# Patient Record
Sex: MALE | Race: WHITE | NOT HISPANIC OR LATINO | Employment: OTHER | URBAN - METROPOLITAN AREA
[De-identification: names, ages, dates, MRNs, and addresses within clinical notes are randomized per-mention and may not be internally consistent; named-entity substitution may affect disease eponyms.]

---

## 2020-01-30 ENCOUNTER — TELEPHONE (OUTPATIENT)
Dept: PEDIATRIC GASTROENTEROLOGY | Facility: CLINIC | Age: 30
End: 2020-01-30

## 2020-01-31 NOTE — TELEPHONE ENCOUNTER
----- Message from Luz Osborn sent at 1/30/2020  4:01 PM CST -----  Contact: Maria Ines Nelson  States he was a previous patient of Dr Fatima's. She has some question regarding a procedure he had done with Dr Fatima. There are some concerns she has, because of some of the things he has going on with his esophagus. She would like to speak to Pam. The patient is 29 and she would like to know if Dr Fatima takes Medicaid. States he has down syndrome.Please call Maria Ines Nelson [phone]. Thank you

## 2020-02-05 ENCOUNTER — TELEPHONE (OUTPATIENT)
Dept: PEDIATRIC GASTROENTEROLOGY | Facility: CLINIC | Age: 30
End: 2020-02-05

## 2020-02-05 NOTE — TELEPHONE ENCOUNTER
----- Message from Bill Shaffer sent at 2/5/2020  3:42 PM CST -----  Contact: pt mother - dionisio   Type:  Patient Returning Call    Who Called:pt mother   Who Left Message for Patient:nurse for Dr  Does the patient know what this is regarding?:has a question about when the Dr did the scope on pt   Would the patient rather a call back or a response via [facility]? Phone   Best Call Back Number: [phone]  Additional Information: nurse called on 01/30/20

## 2020-02-05 NOTE — TELEPHONE ENCOUNTER
Mom is calling, son has Down Syndrome and had EGD done with Dr. Fatima previously. Mom is asking about how it looked.  RN informed mom she will have to contact [facility] to get the op report.  Mom states he will occasionally have food stuck in his throat. RN encouraged mom to contact PCP and get appt with adult GI doctor to be evaluated.

## 2022-12-30 DIAGNOSIS — R13.11 ORAL MOTOR DYSFUNCTION: ICD-10-CM

## 2022-12-30 DIAGNOSIS — R13.14 PHARYNGOESOPHAGEAL DYSPHAGIA: Primary | ICD-10-CM

## 2023-01-03 ENCOUNTER — CLINICAL SUPPORT (OUTPATIENT)
Dept: REHABILITATION | Facility: HOSPITAL | Age: 33
End: 2023-01-03
Payer: MEDICARE

## 2023-01-03 DIAGNOSIS — R47.1 DYSARTHRIA: ICD-10-CM

## 2023-01-03 DIAGNOSIS — R13.10 DYSPHAGIA, UNSPECIFIED TYPE: ICD-10-CM

## 2023-01-03 DIAGNOSIS — F80.0 SPEECH SOUND DISORDER: ICD-10-CM

## 2023-01-03 PROCEDURE — 92610 EVALUATE SWALLOWING FUNCTION: CPT | Performed by: SPEECH-LANGUAGE PATHOLOGIST

## 2023-01-03 PROCEDURE — 92522 EVALUATE SPEECH PRODUCTION: CPT | Performed by: SPEECH-LANGUAGE PATHOLOGIST

## 2023-01-03 NOTE — PROGRESS NOTES
See initial evaluation in plan of care.     Diana Burton MA, CCC-SLP  Speech Language Pathologist  1/4/2023

## 2023-01-04 PROBLEM — R47.1 DYSARTHRIA: Status: ACTIVE | Noted: 2023-01-04

## 2023-01-04 NOTE — PLAN OF CARE
"[facility]  Speech Therapy Evaluation -Dysphagia    Date: 1/3/2023     Name: Isaías Nelson   MRN: [id number]    Therapy Diagnosis:   Encounter Diagnoses   Name Primary?    Speech sound disorder     Dysphagia, unspecified type     Physician: Latrell Webb MD  Physician Orders: Ambulatory referral/consult to speech therapy   Medical Diagnosis: Pharyngoesophageal dysphagia [R13.14], Oral motor dysfunction [R13.11]    Visit # / Visits Authorized:  1 / 1   Date of Evaluation:  1/3/2023   Insurance Authorization Period: 12/30/2022 - 12/30/2023  Plan of Care Certification:    1/3/2023 to 3/14/23      Time In: 10:30 AM   Time Out: 11:18 AM   Procedure Min.   Swallow and Oral Function Evaluation   24   Evaluation of Speech Sound Production  24     Precautions: Standard, Fall, Cognition, Communication, Dysphagia, and Reduced health literacy   Subjective     History of Current Condition:   Patient presents on time today with his mother. Patient's mother provided history as well as was taken from chart review. Patient's medical history is significant for Down syndrome. Patient's mother reports years long history of food "getting stuck" during the swallow as well as gagging with eventual expectoration of foods. She also reports coughing with liquids with patient reporting "it went the wrong way". Chart review also significant for ~20 lb weight loss in last few years. Patient's mother reports most recent swallow study was completed ~3 years ago. Chart review significant for MBSS 8/10/20 with overall functional swallow and no recommendation for therapy. Patient's mother also reports overall significantly slow movements/actions such as feeding himself, walking, talking, closing doors, etc.      Patient's mother also reports very low volume and slow rate of speech both of which significantly impact intelligibility. Per mother, patient will add sounds, specifically, /t/, to the end of words also impacting " intelligibility. Patient completed PT, OT, and speech therapy as a child 25+ years ago.       Past Medical History: Isaías Nelson  has no past medical history on file.  Isaías Nelson  has no past surgical history on file.  Medical Hx and Allergies: Isaías currently has no medications in their medication list. Review of patient's allergies indicates:  Not on File    Prior Therapy:  yes-PT, OT, ST 25+ years ago  Social History:  Lives with parents   Prior Level of Function:  same as current  Current Level of Function: reported overt signs of dysphagia per parent and reduced intelligibility  Pain: no pain behaviors observed in today's session  Nutrition:  meeting all nutrition/hydration needs via PO intake  Patient's Therapy Goals:  evaluate swallow and speech functioning    Objective   Formal Assessment:    Cranial Nerve Examination  Cranial Nerve 5: Trigeminal Nerve  Motor Jaw Posture at rest: Closed  Mandible Elevation/Depression: WFL  Mandible lateralization: WFL  Abnormal movement: absent Interpretation: Intact bilaterally    Sensory Forehead: WFL  Cheek: WFL  Jaw: WFL  Facial Pain: None noted Interpretation: Intact bilaterally      Cranial Nerve 7: Facial Nerve  Motor Facial Symmetry: WNL  Wrinkle Forehead: WFL  Close eyes tightly: WFL  Labial Protrusion: WFL  Labial Retraction: WFL  Buccal Strength with Labial Seal: WFL  Abnormal movement: absent Interpretation: Intact bilaterally    Sensory Formal testing not completed.       Cranial Nerves IX and X: Glossopharyngeal and Vagus Nerves  Motor Palatal Symmetry (Rest): WNL  Palatal Symmetry (Movement): WNL  Cough: Perceptually strong  Voice Prior to PO intake: Clear  Resonance: Normal  Abnormal movement: absent Interpretation: Intact bilaterally      Cranial Nerve XII: Hypoglossal Nerve  Motor Tongue at rest: WNL  Lingual Protrusion: WNL  Lingual Protrusion against Resistance: reduced  Lingual Lateralization: WNL  Abnormal movement: absent Interpretation:  cannot rule out CN involvement     Other information:  Volitional Swallow: Able to palpate laryngeal rise  Mucosal Quality: No abnormal findings  Secretion Management: no overt deficits noted/observed  Dentition: Good condition for speech and mastication        Clinical Swallow Evaluation:   Predisposing dysphagia risk factors: Down Syndrome  Clinical signs of possible chronic dysphagia: parent reported gagging/coughing during meals, weight loss  Precipitating dysphagia risk factors: NA    EAT-10 Score:    Eating Assessment Tool (EAT-10) is a questionnaire given to the patient to  his swallowing function and quality of life as it relates to patient's perceived swallowing deficits. A score that is greater than 3 may be indicative of swallowing problems. (Fantasma et al., 2008); higher scores correlate with increased penetration-aspiration  scale scores, and scores greater than 15 results in the patient being 2.2 times more likely to aspirate (Prasanna et al., 2015)    Unable to complete secondary to patient's cognitive status.       [place] Swallow Protocol:  [place] Swallow Protocol dictates patient remain NPO if fail screener; (Pjr et al. 2014) however, as objective swallow assessment is not available for greater than a week, patient will remain on current diet until objective assessment is completed unless otherwise indicated.     The [place] Swallow Protocol was administered. The patient was alert and provided the instructions prior to the beginning of the protocol. The patient consumed 3 oz. Patient did not drink with consecutive swallows. Patient without overt signs or symptoms of penetration/aspiration.     Clinical Swallow Examination:   Of note, patient self-fed throughout evaluation. Patient presented with:     CONSISTENCY  NOTES   THIN (IDDSI 0) 3 oz water challenge    Patient without overt signs of aspiration. No change in vocal quality. Patient with significantly increased feeding time and delayed  initiation of oral swallow. 3-4 swallows required per bolus.    PUREE (IDDSI 4/Extremely Thick)   TSP bites of pudding x 5 Patient without overt signs of aspiration. No oral residue, or anterior loss of bolus. Patient with significantly increased feeding time and delayed initiation of oral swallow. Patient required 1-2 swallows per bolus.    SOLID (IDDSI 7/Regular) Bite of juan pablo cracker x 3   Patient without overt signs of aspiration. No oral residue, or anterior loss of bolus. Patient with significantly increased feeding time and delayed initiation of oral swallow. Patient required 1-2 swallows per bolus.      *Thickened liquids were not used in this assessment. Moraima (2018) reported that thickened liquids have no sound evidence as reducing risk of pneumonia in patients with dysphagia and can cause harm by increasing risk of dehydration. It also presents an increased risk of UTI, electrolyte imbalance, constipation, fecal impaction, cognitive impairment, functional decline, and even death (George, 2002; Freddy, 2016).  This supports the assertion that we should confirm a patient requires thickened liquids with an instrumental swallow study prior to recommending them.    INTERPRETATION:  Clinical swallow evaluation (CSE) revealed oral phase characterized by lingual, labial, and buccal strength and range of motion within functional limits for lip closure, bolus preparation and propulsion. The patient had no anterior loss of the bolus with complete closure of the lips around the cup rim/spoon. No residue remained in the oral cavity following the swallow. Patient without overt clinical signs/symptoms of aspiration on any PO trials given. Patient presents with a possibly inefficient swallow as indicated by multiple swallows per bolus and significantly increased feeding time. Contributing risk factors for dysphagia include cognitive deficits.     Clinical signs of oropharyngeal dysphagia, likely chronic related to  Down syndrome as well as cognitive impact on efficiency of swallow. Swallowing prognosis is Good secondary to family support. Instrumental swallow study is indicated to assess the swallowing physiology and rule out aspiration of various consistencies. Given prolonged wait time for outpatient instrumental studies, patient should continue current diet prior to instrumental study.      Articulation Assessment:  The Griffith Fristoe Test of Articulation - Third Edition (GFTA-3) was administered to assess Isaías's production of consonants at the individual word and sentence level. This assessment consisted of a series of color pictures, which Isaías was asked to label following specific instructions. Responses were recorded and analyzed to determine the presence/absence of an articulation delay/disorder.        Isaías scored a standard score of 43 on the Sounds-In-Words Subtest of the GFTA-3, which is below average for Isaías's chronological age level. This score indicates the presence of a speech sound disorder.      Sounds-in-Words Subtest  Raw Score Standard Score Percentile Rank   27 43 < 0.1     Sounds-in-words Phonetic Error Analysis  Sound Initial Medial Final   p   k  omit   b      t  k omit   d  omit    k   omit   kw      g b  k   m      n      ng      f   S/omit   v  b omit   ? n  omit   ð  omit    s      z s s    ?      t?      d?      r      l  y     ?      w      j      h      Blends Initial Medial Final   bl      br r     dr tr     fr      gl g     gr      kr      kw      nt      pl pr     pr      sl sw     st      sw      sp      tr          Ages at which 90% of the GFTA-3 Normative Sample Mastered Consonants and Consonant Clusters by Initial, Medial, and Final positions (Female):  (Note: Mastery = 85% or greater correct productions)  Age Initial Position Medial Position Final Position   2:0-2:5  /p/    2:6-2:11 /m/     3:0-3:5 /b/ /d/ /k/ /n/ /w/ /h/ /d/ /g/ /m/ /n/ /f/ /p/   3:6-3:11  /f/  /n/    4:0-4:5 /t/ /sp/ /st/ /b/ /k/ /ng/ /z/ /j/ /d/ /k/ /m/ /f/ /v/ /nt/   4:6-4:11  /ch/ /dg/ /l/ /j/ /fr/ /gl/ /pl/ /tr/ /ch/ /l/ /b/ /t/ /g/ /sh/ /ch/   5:0-5:11 /p/ /s/ /z/ /sh/ /bl/ /dr/ /kw/ /pr/ /sl/ /sw/ /sh/ /s/ /l/   6:0-6:11 /v/ /voiced th/ /r/ /br/ /gr/ /kr/ /v/ /s/ /dg/ /r/ /br/ /er/ /ng/ /z/ /r/   7:0-7:11  /r/ /br/ /fr/ /pr/ /sl/ /t/ /voiced th/ /voicless th/   8:0-8:11      >8:11        Ages at which 90% of the GF-3 Normative Sample Mastered Consonants and Consonant Clusters by Initial, Medial, and Final positions (Male):  (Note: Mastery = 85% or greater correct productions)  Age Initial Position Medial Position Final Position   2:0-2:5      2:6-2:11 /m/ /p/    3:0-3:5 /b/ /d/ /n/ /f/ /h/ /d/ /g/ /m/ /ng/ /f/ /p/ /n/ /f/    3:6-3:11  /k/ /w/ /n/ /z/ /j/  /b/ /d/ /k/ /m/ /nt/   4:0-4:5 /t/ /kw/ /b/ /k/ /g/ /v/   4:6-4:11  /s/ /sh/ /ch/ /dg/ /ch/ /sh/ /t/ /sh/ /ch/   5:0-5:11 /p/ /z/ /l/ /j/ /bl/ /pl/ /sp/ /st/ /sw/ /s/ /l/ /ng/ /z/    6:0-6:11 /g/ /v/ /dr/ /gl/ /gr/ /kr/ /tr/ /r/    7:0-7:11 /voiced th/ /r/ /br/ /fr/ /pr/ /sl/ /v/ /er/ /l/ /r/   8:0-8:11  /t/ /voiced th/ /dg/ /br/ /voiceless th/ /s/   >8:11 /voiceless th/       MOTOR SPEECH/DYSARTHRIA EVALUATION    History  Diagnosis: Down Syndrome  Localization: NA  Associated Deficits: Cognitive/Linguistic    Evaluation of Speech Mechanisms  Respiration:  Posture: WNL  Breath Support: Shallow  Breath Rate: Slow    Respiratory Features: Abnormal: Thoracic    *See cranial nerve evaluation above.       Alternating Motion Rates (AMRs) and Sequential Motion Rates (SMRs)  SMRs /pu/  Rate Slow   Rhythm WNL   Accuracy Blurred   Norm 5.0-7.1 Giuliana/Sec Below norm     SMRs /tu/  Rate Slow   Rhythm WNL   Accuracy Blurred   Norm 4.8-7.1 Giuliana/Sec Below norm     SMRs /ku/  Rate Slow   Rhythm WNL   Accuracy Blurred   Norm 4.4-7.5 Giuliana/Sec Below norm     AMRs /putuku/  Rate Slow   Rhythm WNL   Accuracy Blurred   Norm 3.6-7.5 Giuliana/Sec Below norm     Perceptual  Ratings  Respiratory Features: NA  Respiratory/Phonatory Features: Reduced/Excessive loudness, mono loudness  Phonatory Features: Monopitch  Phonatory Quality: Harshness  Resonatory Features: Weak pressure consonants  Articulatory Features: Imprecise consonants  Prosodic Features: Slow rate, short phrases  Other Features: NA      Treatment   Total Treatment Time Separate from Evaluation: not applicable   no treatment performed secondary to time to complete evaluation.    Education: Plan of Care and role of SLP in care were discussed with patient. Patient and family members expressed understanding.     Home Program: to be established pending MBSS findings/recommendations and initial therapy session  Assessment     JEANNETTE presents to [facility] status post medical diagnosis of Pharyngoesophageal dysphagia [R13.14], Oral motor dysfunction [R13.11]. He presents with clinical signs of oropharyngeal dysphagia, likely chronic related to Down syndrome as well as cognitive impact on efficiency of swallow. Swallowing prognosis is Good secondary to family support. Instrumental swallow study is indicated to assess the swallowing physiology and rule out aspiration of various consistencies. Given prolonged wait time for outpatient instrumental studies, patient should continue current diet prior to instrumental study.    Patient presents with mild-moderate undifferentiated (unclear secondary to lack of lesion site) dysarthria characterized by slightly reduced lingual strength and overall hypotonia consistent with DS with reduced breath support resulting in reduced and monoloudness, monopitch, imprecise consonants, slow rate and short phrases all of which are impacting intelligibility. Additionally, he presents with severe speech sound disorder characterized by distortion/substitution/reduction of clusters and consonant blends, substitution/devoicing of /s/ for /z/ and final consonant deletion.     Demonstrates  impairments including limitations as described in the problem list. Positive prognostic factors include family support. Negative prognostic factors include NA. No barriers to therapy identified. Patient will benefit from skilled, outpatient neurological rehabilitation speech therapy.    Rehab Potential: fair  Patient's spiritual, cultural, and educational needs considered and patient agreeable to plan of care and goals.    Short Term Goals (5 weeks):   Dysphagia goals to be determined pending MBSS findings/recommendations.  Patient will complete diaphragmatic breathing exercises by producing maximal exhalation via diaphragmic breathing with a duration of 5 seconds in 10 trials, consistently with minimal tactile and verbal clinician cues.   In order to promote generalization of intelligibility, patient will produce sentences during diaphragmatic breathing while incorporating intelligibility strategies (overarticulation, increased loudness) at 80% accuracy with moderate verbal and visual cues.   Patient will complete x3-5 rounds of PhoRTE exercises within a session with min cues to increase breath support and loudness.   Patient will rate his speech while reading sentences as at least a 2 on a 3 point scale (1=same as before beginning therapy, 2=better but not best, 3=best possible outcome) on 9 /10 trials given auditory feedback.   In order to increase awareness of dysarthria, patient will self-monitor phrases and sentences produced by patient and clinician for varied intonation with minimal cues.  In order to increase awareness of dysarthria, patient will self-monitor phrases and sentences for intelligibility using minimal verbal cues.   Patient will implement increased intonation, overarticulation, and reduced rate strategies to increase overall intelligibility for functional communication with 80% accuracy and minimal verbal cues for use of strategies.   Patient and/or family will be educated on nature of  dysarthria, and rationale for completion of exercises/use of strategies with returned demonstration of information.   Patient will produce /g/ at the word level in all positions of words with 90% accuracy and minimal cueing over 3 sessions.   Patient will produce /k/ at the word level in all positions of words with 90% accuracy and minimal cueing over 3 sessions.  Patient will produce /t/ at the word level in all positions of words with 90% accuracy and minimal cueing over 3 sessions.    Patient will produce /d/ at the word level in all positions of words with 90% accuracy and minimal cueing over 3 sessions.   Patient will produce /f/ at the word level in all positions of words with 90% accuracy and minimal cueing over 3 sessions.   Patient will produce /v/ at the word level in all positions of words with 90% accuracy and minimal cueing over 3 sessions.   Patient will produce voiced /th/ at the word level in all positions of words with 90% accuracy and minimal cueing over 3 sessions.   Patient will produce voiceless /th/ at the word level in all positions of words with 90% accuracy and minimal cueing over 3 sessions.  Patient will produce /z/ at the word level in all positions of words with 90% accuracy and minimal cueing over 3 sessions.  Patient will produce /r/ blends at the word level in all positions of words with 90% accuracy and minimal cueing over 3 sessions.  Patient will produce /l/ blends at the word level in all positions of words with 90% accuracy and minimal cueing over 3 sessions.      Long Term Goals (10 weeks):   Patient will maintain adequate hydration/nutrition with optimum safety and efficiency of swallowing function on PO intake without overt signs and symptoms of aspiration for least restrictive diet level.   Patient will develop functional and intelligible speech and utilize compensatory strategies through the use of adequate labial and lingual function, increased articulatory precision and  speech prosody.      Plan     Plan of Care Certification Period: 1/3/2023 to 3/14/23    Recommended Treatment Plan:  Patient will participate in the [facility] rehabilitation program for speech therapy 1 times per week for 10 weeks to address his Communication, Motor Speech, and Swallow deficits, to educate patient and their family, and to participate in a home exercise program.    Other Recommendations: Will reach out to referring provider for MBSS order.     Therapist's Name:   Diana Burton MA, CCC-SLP  Speech Language Pathologist  1/3/2023     I CERTIFY THE NEED FOR THESE SERVICES FURNISHED UNDER THIS PLAN OF TREATMENT AND WHILE UNDER MY CARE    Physician Name: _______________________________    Physician Signature: ____________________________

## 2023-01-10 ENCOUNTER — CLINICAL SUPPORT (OUTPATIENT)
Dept: REHABILITATION | Facility: HOSPITAL | Age: 33
End: 2023-01-10
Payer: MEDICARE

## 2023-01-10 DIAGNOSIS — R47.1 DYSARTHRIA: ICD-10-CM

## 2023-01-10 DIAGNOSIS — R13.10 DYSPHAGIA, UNSPECIFIED TYPE: ICD-10-CM

## 2023-01-10 DIAGNOSIS — F80.0 SPEECH SOUND DISORDER: Primary | ICD-10-CM

## 2023-01-10 PROCEDURE — 92507 TX SP LANG VOICE COMM INDIV: CPT | Performed by: SPEECH-LANGUAGE PATHOLOGIST

## 2023-01-10 NOTE — PROGRESS NOTES
[facility]  Speech Therapy Treatment Note- Dysphagia and Speech  Date: 1/10/2023     Name: Isaías Nelson   MRN: [id number]   Therapy Diagnosis:   Encounter Diagnoses   Name Primary?    Speech sound disorder Yes    Dysphagia, unspecified type     Dysarthria    Physician: Latrell Webb MD  Physician Orders: Ambulatory referral/consult to speech therapy   Medical Diagnosis: Pharyngoesophageal dysphagia [R13.14], Oral motor dysfunction [R13.11]    Visit #/ Visits Authorized: 1/ 25 (+eval)  Date of Evaluation:  1/3/23  Insurance Authorization Period: 1/3/2023 - 1/3/2024  Plan of Care Expiration Date:    3/14/23  Extended Plan of Care:  -   Progress Note: 2/3/23   Visits Cancelled: 0  Visits No Show: 0    Time In:  3:28 PM  Time Out:  4:15 PM  Total Billable Time: 47 mins     Precautions: Standard, Communication, and Reduced health literacy   Subjective:   Patient reports: he presents on time with his mother and primary caregiver. Patient's mother reports he spent time with his friend/caregiver Bozena this week including a trip to the [employer] and playing with her cats.   He was compliant to home exercise program.     Response to previous treatment: first treatment session     Pain Scale:  0/10 on a Visual Analog Scale currently.   Pain Location: NA  Objective:   TIMED  Procedure Min.   Cognitive Therapeutic Interventions, first 15 minutes CPT 04361  0   Cognitive Therapeutic Interventions, each additional 15 minutes CPT 17541  0         UNTIMED  Procedure Min.   Speech- Language- Voice Therapy  47   Dysphagia Therapy  0   Total Timed Units: 0  Total Untimed Units: 1  Charges Billed/Number of units: 48856/1    Short Term Goals: (5 weeks) Current Progress:   Patient will complete diaphragmatic breathing exercises by producing maximal exhalation via diaphragmic breathing with a duration of 5 seconds in 10 trials, consistently with minimal tactile and verbal clinician cues.     Progressing/ Not Met  "1/10/2023   Patient introduced to diaphragmatic breathing in today's session with visual, verbal, and tactile cueing for execution of breathing. He required cueing for reduced thoracic movement and prolonged inhalation and exhalation. Patient encouraged to continue daily as tolerated.    In order to promote generalization of intelligibility, patient will produce sentences during diaphragmatic breathing while incorporating intelligibility strategies (overarticulation, increased loudness) at 80% accuracy with moderate verbal and visual cues.      Progressing/ Not Met 1/10/2023   Patient was introduced (with visual, verbal, tactile and motor cues) coordination of diaphragmatic breath for phonation with improved loudness and therefore overall intelligibility. He required maximum cueing for coordination of respiration and phonation with ~40% accuracy in today's session for /ah/ and simple words such as his name "Ari", "Mom", "hey", etc. Will continue to address and patient and his mother encouraged to practice daily at home. Significant improvement in loudness and intelligibility given coordination of breathing for speech.      Patient will complete x3-5 rounds of PhoRTE exercises within a session with min cues to increase breath support and loudness.      Progressing/ Not Met 1/10/2023   Not formally addressed      Patient will rate his speech while reading sentences as at least a 2 on a 3 point scale (1=same as before beginning therapy, 2=better but not best, 3=best possible outcome) on 9 /10 trials given auditory feedback.      Progressing/ Not Met 1/10/2023   Not formally addressed      In order to increase awareness of dysarthria, patient will self-monitor phrases and sentences produced by patient and clinician for varied intonation with minimal cues.     Progressing/ Not Met 1/10/2023   Not formally addressed      In order to increase awareness of dysarthria, patient will self-monitor phrases and sentences for " intelligibility using minimal verbal cues.      Progressing/ Not Met 1/10/2023   Not formally addressed      Patient will implement increased intonation, overarticulation, and reduced rate strategies to increase overall intelligibility for functional communication with 80% accuracy and minimal verbal cues for use of strategies.      Progressing/ Not Met 1/10/2023   Not formally addressed      Patient and/or family will be educated on nature of dysarthria, and rationale for completion of exercises/use of strategies with returned demonstration of information.     Goal Met 1/10/2023   Patient's mother educated extensively regarding nature of dysarthria and domains involved in speech mechanism (respiration, phonation, resonance, articulation, prosody) as well as impact of each on intelligibility in patient's speech. She expressed understanding of information provided as evidenced by asking good questions.    Patient will produce /g/ at the word level in all positions of words with 90% accuracy and minimal cueing over 3 sessions.     Progressing/ Not Met 1/10/2023   Not formally addressed     Patient will produce /k/ at the word level in all positions of words with 90% accuracy and minimal cueing over 3 sessions.    Progressing/ Not Met 1/10/2023   Not formally addressed   Patient will produce /t/ at the word level in all positions of words with 90% accuracy and minimal cueing over 3 sessions.      Progressing/ Not Met 1/10/2023   Patient produced at the word level:   /t/ initial: 94% accuracy, independently   /t/ medial: 73% accuracy, minimal cueing   /t/ final: 58% accuracy, mod verbal cueing for final consonant deletion and avoiding adding /s/ to end of words     Patient will produce /d/ at the word level in all positions of words with 90% accuracy and minimal cueing over 3 sessions.     Progressing/ Not Met 1/10/2023   Not formally addressed     Patient will produce /f/ at the word level in all positions of words  with 90% accuracy and minimal cueing over 3 sessions.     Progressing/ Not Met 1/10/2023   Not formally addressed   Patient will produce /v/ at the word level in all positions of words with 90% accuracy and minimal cueing over 3 sessions.     Progressing/ Not Met 1/10/2023   Not formally addressed   Patient will produce voiced /th/ at the word level in all positions of words with 90% accuracy and minimal cueing over 3 sessions.     Progressing/ Not Met 1/10/2023   Not formally addressed   Patient will produce voiceless /th/ at the word level in all positions of words with 90% accuracy and minimal cueing over 3 sessions.    Progressing/ Not Met 1/10/2023   Not formally addressed   Patient will produce /z/ at the word level in all positions of words with 90% accuracy and minimal cueing over 3 sessions.    Progressing/ Not Met 1/10/2023   Not formally addressed   Patient will produce /r/ blends at the word level in all positions of words with 90% accuracy and minimal cueing over 3 sessions.    Progressing/ Not Met 1/10/2023   Not formally addressed   Patient will produce /l/ blends at the word level in all positions of words with 90% accuracy and minimal cueing over 3 sessions.    Progressing/ Not Met 1/10/2023   Not formally addressed       Patient Education/Response:   Patient's mother educated extensively regarding nature of dysarthria and domains involved in speech mechanism (respiration, phonation, resonance, articulation, prosody) as well as impact of each on intelligibility in patient's speech. She expressed understanding of information provided as evidenced by asking good questions.     Home program established: yes-diaphragmatic breathing and production of simple, personally relevant words for coordination of respiration and phonation  Exercises were reviewed and JEANNETTE was able to demonstrate them prior to the end of the session.  JEANNETTE demonstrated fair  understanding of the education provided.     See  Electronic Medical Record under Patient Instructions for exercises provided throughout therapy.  Assessment:   JEANNETTE is progressing well towards his goals. Introduction to diaphragmatic breathing and use of breath for improved loudness and intelligibility. Improvement over course of session with significant gains in intelligibility given use of breath support. Current goals remain appropriate. Goals to be updated as necessary.     Patient prognosis is Good. Patient will continue to benefit from skilled outpatient speech and language therapy to address the deficits listed in the problem list on initial evaluation, provide patient/family education and to maximize patient's level of independence in the home and community environment.     Medical necessity is demonstrated by the following IMPAIRMENTS:  Dysarthria and articulation deficits impact patient's ability to communicate effectively in all medical, social, home environments safely and efficiently.     Barriers to Therapy: NA  Patient's spiritual, cultural and educational needs considered and patient agreeable to plan of care and goals.  Plan:   Continue Plan of Care with focus on diaphragmatic breathing, coordination of breath support for phonation and speech and articulation goals targeting /t/, /d/,/ k/, and /g/.    Diana Burton MA, CCC-SLP  Speech Language Pathologist  1/10/2023

## 2023-01-17 ENCOUNTER — CLINICAL SUPPORT (OUTPATIENT)
Dept: REHABILITATION | Facility: HOSPITAL | Age: 33
End: 2023-01-17
Payer: MEDICARE

## 2023-01-17 DIAGNOSIS — R47.1 DYSARTHRIA: ICD-10-CM

## 2023-01-17 DIAGNOSIS — F80.0 SPEECH SOUND DISORDER: Primary | ICD-10-CM

## 2023-01-17 DIAGNOSIS — R13.10 DYSPHAGIA, UNSPECIFIED TYPE: ICD-10-CM

## 2023-01-17 PROCEDURE — 92507 TX SP LANG VOICE COMM INDIV: CPT | Performed by: SPEECH-LANGUAGE PATHOLOGIST

## 2023-01-17 NOTE — PROGRESS NOTES
[facility]  Speech Therapy Treatment Note- Dysphagia and Speech  Date: 1/17/2023     Name: Isaías Nelson   MRN: [id number]   Therapy Diagnosis:   Encounter Diagnoses   Name Primary?    Speech sound disorder Yes    Dysphagia, unspecified type     Dysarthria    Physician: Latrell Webb MD  Physician Orders: Ambulatory referral/consult to speech therapy   Medical Diagnosis: Pharyngoesophageal dysphagia [R13.14], Oral motor dysfunction [R13.11]    Visit #/ Visits Authorized: 1/ 25 (+eval)  Date of Evaluation:  1/3/23  Insurance Authorization Period: 1/3/2023 - 1/3/2024  Plan of Care Expiration Date:    3/14/23  Extended Plan of Care:  -   Progress Note: 2/3/23   Visits Cancelled: 0  Visits No Show: 0    Time In:  3:38 PM  Time Out:  4:20 PM  Total Billable Time: 42 mins     Precautions: Standard, Communication, and Reduced health literacy   Subjective:   Patient reports: he presents on time with his caregiver, Bozena. He has been compliant to HEP and practicing /t[place] this [place] as well as use of dysarthria strategies for personally relevant phrases.   He was compliant to home exercise program.     Response to previous treatment: use of dysarthria strategies for personally relevant words     Pain Scale:  0/10 on a Visual Analog Scale currently.   Pain Location: NA  Objective:   TIMED  Procedure Min.   Cognitive Therapeutic Interventions, first 15 minutes CPT 81309  0   Cognitive Therapeutic Interventions, each additional 15 minutes CPT 52186  0         UNTIMED  Procedure Min.   Speech- Language- Voice Therapy  42   Dysphagia Therapy  0   Total Timed Units: 0  Total Untimed Units: 1  Charges Billed/Number of units: 62451/1    Short Term Goals: (5 weeks) Current Progress:   Patient will complete diaphragmatic breathing exercises by producing maximal exhalation via diaphragmic breathing with a duration of 5 seconds in 10 trials, consistently with minimal tactile and verbal clinician cues.  "    Progressing/ Not Met 1/17/2023   Patient introduced to diaphragmatic breathing in today's session with visual, verbal, and tactile cueing for execution of breathing. He required cueing for reduced thoracic movement and prolonged inhalation and exhalation. Patient encouraged to continue daily as tolerated.    In order to promote generalization of intelligibility, patient will produce sentences during diaphragmatic breathing while incorporating intelligibility strategies (overarticulation, increased loudness) at 80% accuracy with moderate verbal and visual cues.      Progressing/ Not Met 1/17/2023   Patient was introduced (with visual, verbal, tactile and motor cues) coordination of diaphragmatic breath for phonation with improved loudness and therefore overall intelligibility. He required maximum cueing for coordination of respiration and phonation with ~45% accuracy in today's session for /ah/ and simple words such as his name "Ari", "Mom", "hey", "dad", "Bozena" etc. Will continue to address and patient and his caregiver encouraged to practice daily at home. Significant improvement in loudness and intelligibility given coordination of breathing for speech.      Patient will complete x3-5 rounds of PhoRTE exercises within a session with min cues to increase breath support and loudness.      Progressing/ Not Met 1/17/2023   Not formally addressed      Patient will rate his speech while reading sentences as at least a 2 on a 3 point scale (1=same as before beginning therapy, 2=better but not best, 3=best possible outcome) on 9 /10 trials given auditory feedback.      Progressing/ Not Met 1/17/2023   Not formally addressed      In order to increase awareness of dysarthria, patient will self-monitor phrases and sentences produced by patient and clinician for varied intonation with minimal cues.     Progressing/ Not Met 1/17/2023   Not formally addressed      In order to increase awareness of dysarthria, patient will " self-monitor phrases and sentences for intelligibility using minimal verbal cues.      Progressing/ Not Met 1/17/2023   Not formally addressed      Patient will implement increased intonation, overarticulation, and reduced rate strategies to increase overall intelligibility for functional communication with 80% accuracy and minimal verbal cues for use of strategies.      Progressing/ Not Met 1/17/2023   Not formally addressed      Patient and/or family will be educated on nature of dysarthria, and rationale for completion of exercises/use of strategies with returned demonstration of information.     Goal Met 1/10/2023   Patient's mother educated extensively regarding nature of dysarthria and domains involved in speech mechanism (respiration, phonation, resonance, articulation, prosody) as well as impact of each on intelligibility in patient's speech. She expressed understanding of information provided as evidenced by asking good questions.    Patient will produce /g/ at the word level in all positions of words with 90% accuracy and minimal cueing over 3 sessions.     Progressing/ Not Met 1/17/2023   Not formally addressed     Patient will produce /k/ at the word level in all positions of words with 90% accuracy and minimal cueing over 3 sessions.    Progressing/ Not Met 1/17/2023   Not formally addressed   Patient will produce /t/ at the word level in all positions of words with 90% accuracy and minimal cueing over 3 sessions.      Progressing/ Not Met 1/17/2023   Patient produced at the word level:   /t/ initial: 94% accuracy, independently   /t/ medial: 73% accuracy, minimal cueing   /t/ final: 58% accuracy, mod verbal cueing for final consonant deletion and avoiding adding /s/ to end of words     Patient will produce /d/ at the word level in all positions of words with 90% accuracy and minimal cueing over 3 sessions.     Progressing/ Not Met 1/17/2023   Patient produced at the word level:   /d/ initial: 76%  accuracy, mod verbal cueing  /d/ medial: 92% accuracy, independently   /d/ final: 53% accuracy, final consonant devoicing but stimulable 100% of time given cues      Patient will produce /f/ at the word level in all positions of words with 90% accuracy and minimal cueing over 3 sessions.     Progressing/ Not Met 1/17/2023   Not formally addressed   Patient will produce /v/ at the word level in all positions of words with 90% accuracy and minimal cueing over 3 sessions.     Progressing/ Not Met 1/17/2023   Not formally addressed   Patient will produce voiced /th/ at the word level in all positions of words with 90% accuracy and minimal cueing over 3 sessions.     Progressing/ Not Met 1/17/2023   Not formally addressed   Patient will produce voiceless /th/ at the word level in all positions of words with 90% accuracy and minimal cueing over 3 sessions.    Progressing/ Not Met 1/17/2023   Not formally addressed   Patient will produce /z/ at the word level in all positions of words with 90% accuracy and minimal cueing over 3 sessions.    Progressing/ Not Met 1/17/2023   Not formally addressed   Patient will produce /r/ blends at the word level in all positions of words with 90% accuracy and minimal cueing over 3 sessions.    Progressing/ Not Met 1/17/2023   Not formally addressed   Patient will produce /l/ blends at the word level in all positions of words with 90% accuracy and minimal cueing over 3 sessions.    Progressing/ Not Met 1/17/2023   Not formally addressed       Patient Education/Response:   Patient's mother educated extensively regarding nature of dysarthria and domains involved in speech mechanism (respiration, phonation, resonance, articulation, prosody) as well as impact of each on intelligibility in patient's speech. She expressed understanding of information provided as evidenced by asking good questions.     Home program established: yes-diaphragmatic breathing and production of simple, personally  relevant words for coordination of respiration and phonation  Exercises were reviewed and JEANNETTE was able to demonstrate them prior to the end of the session.  JEANNETTE demonstrated fair  understanding of the education provided.     See Electronic Medical Record under Patient Instructions for exercises provided throughout therapy.  Assessment:   JEANNETTE is progressing well towards his goals. Improvement over course of session with gains in intelligibility given use of breath support to coordinate for speech. Current goals remain appropriate. Goals to be updated as necessary.     Patient prognosis is Good. Patient will continue to benefit from skilled outpatient speech and language therapy to address the deficits listed in the problem list on initial evaluation, provide patient/family education and to maximize patient's level of independence in the home and community environment.     Medical necessity is demonstrated by the following IMPAIRMENTS:  Dysarthria and articulation deficits impact patient's ability to communicate effectively in all medical, social, home environments safely and efficiently.     Barriers to Therapy: NA  Patient's spiritual, cultural and educational needs considered and patient agreeable to plan of care and goals.  Plan:   Continue Plan of Care with focus on diaphragmatic breathing, coordination of breath support for phonation and speech and articulation goals targeting /t/, /d/,/ k/, and /g/.    Diana Burton MA, CCC-SLP  Speech Language Pathologist  1/17/2023

## 2023-01-24 ENCOUNTER — CLINICAL SUPPORT (OUTPATIENT)
Dept: REHABILITATION | Facility: HOSPITAL | Age: 33
End: 2023-01-24
Payer: MEDICARE

## 2023-01-24 DIAGNOSIS — F80.0 SPEECH SOUND DISORDER: Primary | ICD-10-CM

## 2023-01-24 DIAGNOSIS — R47.1 DYSARTHRIA: ICD-10-CM

## 2023-01-24 DIAGNOSIS — R13.14 PHARYNGOESOPHAGEAL DYSPHAGIA: Primary | ICD-10-CM

## 2023-01-24 DIAGNOSIS — R13.10 DYSPHAGIA, UNSPECIFIED TYPE: ICD-10-CM

## 2023-01-24 PROCEDURE — 92507 TX SP LANG VOICE COMM INDIV: CPT | Performed by: SPEECH-LANGUAGE PATHOLOGIST

## 2023-01-24 NOTE — PROGRESS NOTES
[facility]  Speech Therapy Treatment Note- Dysphagia and Speech  Date: 1/24/2023     Name: Isaías Nelson   MRN: [id number]   Therapy Diagnosis:   Encounter Diagnoses   Name Primary?    Speech sound disorder Yes    Dysphagia, unspecified type     Dysarthria    Physician: Latrell Webb MD  Physician Orders: Ambulatory referral/consult to speech therapy   Medical Diagnosis: Pharyngoesophageal dysphagia [R13.14], Oral motor dysfunction [R13.11]    Visit #/ Visits Authorized: 3/ 25 (+eval)  Date of Evaluation:  1/3/23  Insurance Authorization Period: 1/3/2023 - 1/3/2024  Plan of Care Expiration Date:    3/14/23  Extended Plan of Care:  -   Progress Note: 2/3/23   Visits Cancelled: 0  Visits No Show: 0    Time In:  3:28 PM  Time Out:  4:15 PM  Total Billable Time: 47 mins     Precautions: Standard, Communication, and Reduced health literacy   Subjective:   Patient reports: he presents on time with his caregiver, Bozena. He has been going to camp this week and playing games.   He was compliant to home exercise program.     Response to previous treatment: use of dysarthria strategies for personally relevant words     Pain Scale:  0/10 on a Visual Analog Scale currently.   Pain Location: NA  Objective:   TIMED  Procedure Min.   Cognitive Therapeutic Interventions, first 15 minutes CPT 58430  0   Cognitive Therapeutic Interventions, each additional 15 minutes CPT 28886  0         UNTIMED  Procedure Min.   Speech- Language- Voice Therapy  47   Dysphagia Therapy  0   Total Timed Units: 0  Total Untimed Units: 1  Charges Billed/Number of units: 05099/1    Short Term Goals: (5 weeks) Current Progress:   Patient will complete diaphragmatic breathing exercises by producing maximal exhalation via diaphragmic breathing with a duration of 5 seconds in 10 trials, consistently with minimal tactile and verbal clinician cues.     Progressing/ Not Met 1/24/2023   Not formally addressed        In order to promote  "generalization of intelligibility, patient will produce sentences during diaphragmatic breathing while incorporating intelligibility strategies (overarticulation, increased loudness) at 80% accuracy with moderate verbal and visual cues.      Progressing/ Not Met 1/24/2023   Patient completed (with visual, verbal, tactile and motor cues) coordination of diaphragmatic breath for phonation with improved loudness and therefore overall intelligibility. He required maximum cueing for coordination of respiration and phonation with ~45% accuracy in today's session for simple words such as his name "Ari", "Mom", "hey", "Diana", "Thania", "Bozena" etc. Will continue to address and patient and his caregiver encouraged to practice daily at home. Significant improvement in loudness and intelligibility given coordination of breathing for speech. Additionally, progress toward working outside of therapy room today with "imaginary" targets such as "yelling at the dog" out the window, etc.     Patient will complete x3-5 rounds of PhoRTE exercises within a session with min cues to increase breath support and loudness.      Progressing/ Not Met 1/24/2023   Not formally addressed      Patient will rate his speech while reading sentences as at least a 2 on a 3 point scale (1=same as before beginning therapy, 2=better but not best, 3=best possible outcome) on 9 /10 trials given auditory feedback.      Progressing/ Not Met 1/24/2023   Not formally addressed      In order to increase awareness of dysarthria, patient will self-monitor phrases and sentences produced by patient and clinician for varied intonation with minimal cues.     Progressing/ Not Met 1/24/2023   Not formally addressed      In order to increase awareness of dysarthria, patient will self-monitor phrases and sentences for intelligibility using minimal verbal cues.      Progressing/ Not Met 1/24/2023   Not formally addressed      Patient will implement increased intonation, " overarticulation, and reduced rate strategies to increase overall intelligibility for functional communication with 80% accuracy and minimal verbal cues for use of strategies.      Progressing/ Not Met 1/24/2023   Not formally addressed      Patient and/or family will be educated on nature of dysarthria, and rationale for completion of exercises/use of strategies with returned demonstration of information.     Goal Met 1/10/2023   Patient's mother educated extensively regarding nature of dysarthria and domains involved in speech mechanism (respiration, phonation, resonance, articulation, prosody) as well as impact of each on intelligibility in patient's speech. She expressed understanding of information provided as evidenced by asking good questions.    Patient will produce /g/ at the word level in all positions of words with 90% accuracy and minimal cueing over 3 sessions.     Progressing/ Not Met 1/24/2023   Not formally addressed     Patient will produce /k/ at the word level in all positions of words with 90% accuracy and minimal cueing over 3 sessions.    Progressing/ Not Met 1/24/2023   Not formally addressed   Patient will produce /t/ at the word level in all positions of words with 90% accuracy and minimal cueing over 3 sessions.      Progressing/ Not Met 1/24/2023   Patient produced at the word level:   /t/ initial: 94% accuracy, independently met x1  /t/ medial: 86% accuracy, minimal cueing   /t/ final: 62% accuracy, mod verbal cueing for final consonant deletion      Patient will produce /d/ at the word level in all positions of words with 90% accuracy and minimal cueing over 3 sessions.     Progressing/ Not Met 1/24/2023   Patient produced at the word level:   /d/ initial: 79% accuracy, mod verbal cueing  /d/ medial: 75% accuracy, mod verbal cueing    /d/ final: 53% accuracy, final consonant devoicing but stimulable 100% of time given cues      Patient will produce /f/ at the word level in all positions  of words with 90% accuracy and minimal cueing over 3 sessions.     Progressing/ Not Met 1/24/2023   Not formally addressed   Patient will produce /v/ at the word level in all positions of words with 90% accuracy and minimal cueing over 3 sessions.     Progressing/ Not Met 1/24/2023   Not formally addressed   Patient will produce voiced /th/ at the word level in all positions of words with 90% accuracy and minimal cueing over 3 sessions.     Progressing/ Not Met 1/24/2023   Not formally addressed   Patient will produce voiceless /th/ at the word level in all positions of words with 90% accuracy and minimal cueing over 3 sessions.    Progressing/ Not Met 1/24/2023   Not formally addressed   Patient will produce /z/ at the word level in all positions of words with 90% accuracy and minimal cueing over 3 sessions.    Progressing/ Not Met 1/24/2023   Not formally addressed   Patient will produce /r/ blends at the word level in all positions of words with 90% accuracy and minimal cueing over 3 sessions.    Progressing/ Not Met 1/24/2023   Not formally addressed   Patient will produce /l/ blends at the word level in all positions of words with 90% accuracy and minimal cueing over 3 sessions.    Progressing/ Not Met 1/24/2023   Not formally addressed       Patient Education/Response:   Patient's mother educated extensively regarding nature of dysarthria and domains involved in speech mechanism (respiration, phonation, resonance, articulation, prosody) as well as impact of each on intelligibility in patient's speech. She expressed understanding of information provided as evidenced by asking good questions.     Home program established: yes-diaphragmatic breathing and production of simple, personally relevant words for coordination of respiration and phonation  Exercises were reviewed and JEANNETTE was able to demonstrate them prior to the end of the session.  JEANNETTE demonstrated fair  understanding of the education provided.      See Electronic Medical Record under Patient Instructions for exercises provided throughout therapy.  Assessment:   JEANNETTE is progressing well towards his goals. Improvement over course of session with gains in intelligibility given use of breath support to coordinate for speech. Improvement in /t/ and /d/ in all positions noted during session. Current goals remain appropriate. Goals to be updated as necessary.     Patient prognosis is Good. Patient will continue to benefit from skilled outpatient speech and language therapy to address the deficits listed in the problem list on initial evaluation, provide patient/family education and to maximize patient's level of independence in the home and community environment.     Medical necessity is demonstrated by the following IMPAIRMENTS:  Dysarthria and articulation deficits impact patient's ability to communicate effectively in all medical, social, home environments safely and efficiently.     Barriers to Therapy: NA  Patient's spiritual, cultural and educational needs considered and patient agreeable to plan of care and goals.  Plan:   Continue Plan of Care with focus on diaphragmatic breathing, coordination of breath support for phonation and speech and articulation goals targeting /t/, /d/,/ k/, and /g/.    GLORY Early  Student Speech Language Pathologist    Diana Burton MA, CCC-SLP  Speech Language Pathologist  1/24/2023

## 2023-01-24 NOTE — PROGRESS NOTES
[facility]  Speech Therapy Treatment Note- Dysphagia and Speech  Date: 1/24/2023     Name: Isaías Nelson   MRN: [id number]   Therapy Diagnosis:   Encounter Diagnoses   Name Primary?    Speech sound disorder Yes    Dysphagia, unspecified type     Dysarthria    Physician: Latrell Webb MD  Physician Orders: Ambulatory referral/consult to speech therapy   Medical Diagnosis: Pharyngoesophageal dysphagia [R13.14], Oral motor dysfunction [R13.11]    Visit #/ Visits Authorized: 3/ 25 (+eval)  Date of Evaluation:  1/3/23  Insurance Authorization Period: 1/3/2023 - 1/3/2024  Plan of Care Expiration Date:    3/14/23  Extended Plan of Care:  -   Progress Note: 2/3/23   Visits Cancelled: 0  Visits No Show: 0    Time In:  3:28 PM  Time Out:  4:20*** PM  Total Billable Time: 42*** mins     Precautions: Standard, Communication, and Reduced health literacy   Subjective:   Patient reports: he presents on time with his caregiver, Bozena. He has been going to camp this week and playing games.   He was compliant to home exercise program.     Response to previous treatment: use of dysarthria strategies for personally relevant words     Pain Scale:  0/10 on a Visual Analog Scale currently.   Pain Location: NA  Objective:   TIMED  Procedure Min.   Cognitive Therapeutic Interventions, first 15 minutes CPT 17997  0   Cognitive Therapeutic Interventions, each additional 15 minutes CPT 42936  0         UNTIMED  Procedure Min.   Speech- Language- Voice Therapy  42***   Dysphagia Therapy  0   Total Timed Units: 0  Total Untimed Units: 1  Charges Billed/Number of units: 26706/1    Short Term Goals: (5 weeks) Current Progress:   Patient will complete diaphragmatic breathing exercises by producing maximal exhalation via diaphragmic breathing with a duration of 5 seconds in 10 trials, consistently with minimal tactile and verbal clinician cues.     Progressing/ Not Met 1/24/2023   Patient introduced to diaphragmatic breathing in  "today's session with visual, verbal, and tactile cueing for execution of breathing. He required cueing for reduced thoracic movement and prolonged inhalation and exhalation. Patient encouraged to continue daily as tolerated.    In order to promote generalization of intelligibility, patient will produce sentences during diaphragmatic breathing while incorporating intelligibility strategies (overarticulation, increased loudness) at 80% accuracy with moderate verbal and visual cues.      Progressing/ Not Met 1/24/2023   Patient was introduced (with visual, verbal, tactile and motor cues) coordination of diaphragmatic breath for phonation with improved loudness and therefore overall intelligibility. He required maximum cueing for coordination of respiration and phonation with ~45% accuracy in today's session for /ah/ and simple words such as his name "Ari", "Mom", "hey", "dad", "Bozena" etc. Will continue to address and patient and his caregiver encouraged to practice daily at home. Significant improvement in loudness and intelligibility given coordination of breathing for speech.      Patient will complete x3-5 rounds of PhoRTE exercises within a session with min cues to increase breath support and loudness.      Progressing/ Not Met 1/24/2023   Not formally addressed      Patient will rate his speech while reading sentences as at least a 2 on a 3 point scale (1=same as before beginning therapy, 2=better but not best, 3=best possible outcome) on 9 /10 trials given auditory feedback.      Progressing/ Not Met 1/24/2023   Not formally addressed      In order to increase awareness of dysarthria, patient will self-monitor phrases and sentences produced by patient and clinician for varied intonation with minimal cues.     Progressing/ Not Met 1/24/2023   Not formally addressed      In order to increase awareness of dysarthria, patient will self-monitor phrases and sentences for intelligibility using minimal verbal cues.  "     Progressing/ Not Met 1/24/2023   Not formally addressed      Patient will implement increased intonation, overarticulation, and reduced rate strategies to increase overall intelligibility for functional communication with 80% accuracy and minimal verbal cues for use of strategies.      Progressing/ Not Met 1/24/2023   Not formally addressed      Patient and/or family will be educated on nature of dysarthria, and rationale for completion of exercises/use of strategies with returned demonstration of information.     Goal Met 1/10/2023   Patient's mother educated extensively regarding nature of dysarthria and domains involved in speech mechanism (respiration, phonation, resonance, articulation, prosody) as well as impact of each on intelligibility in patient's speech. She expressed understanding of information provided as evidenced by asking good questions.    Patient will produce /g/ at the word level in all positions of words with 90% accuracy and minimal cueing over 3 sessions.     Progressing/ Not Met 1/24/2023   Not formally addressed     Patient will produce /k/ at the word level in all positions of words with 90% accuracy and minimal cueing over 3 sessions.    Progressing/ Not Met 1/24/2023   Not formally addressed   Patient will produce /t/ at the word level in all positions of words with 90% accuracy and minimal cueing over 3 sessions.      Progressing/ Not Met 1/24/2023   Patient produced at the word level:   /t/ initial: 94% accuracy, independently   /t/ medial: 73% accuracy, minimal cueing   /t/ final: 58% accuracy, mod verbal cueing for final consonant deletion and avoiding adding /s/ to end of words     Patient will produce /d/ at the word level in all positions of words with 90% accuracy and minimal cueing over 3 sessions.     Progressing/ Not Met 1/24/2023   Patient produced at the word level:   /d/ initial: 76% accuracy, mod verbal cueing  /d/ medial: 92% accuracy, independently   /d/ final: 53%  accuracy, final consonant devoicing but stimulable 100% of time given cues      Patient will produce /f/ at the word level in all positions of words with 90% accuracy and minimal cueing over 3 sessions.     Progressing/ Not Met 1/24/2023   Not formally addressed   Patient will produce /v/ at the word level in all positions of words with 90% accuracy and minimal cueing over 3 sessions.     Progressing/ Not Met 1/24/2023   Not formally addressed   Patient will produce voiced /th/ at the word level in all positions of words with 90% accuracy and minimal cueing over 3 sessions.     Progressing/ Not Met 1/24/2023   Not formally addressed   Patient will produce voiceless /th/ at the word level in all positions of words with 90% accuracy and minimal cueing over 3 sessions.    Progressing/ Not Met 1/24/2023   Not formally addressed   Patient will produce /z/ at the word level in all positions of words with 90% accuracy and minimal cueing over 3 sessions.    Progressing/ Not Met 1/24/2023   Not formally addressed   Patient will produce /r/ blends at the word level in all positions of words with 90% accuracy and minimal cueing over 3 sessions.    Progressing/ Not Met 1/24/2023   Not formally addressed   Patient will produce /l/ blends at the word level in all positions of words with 90% accuracy and minimal cueing over 3 sessions.    Progressing/ Not Met 1/24/2023   Not formally addressed       Patient Education/Response:   Patient's mother educated extensively regarding nature of dysarthria and domains involved in speech mechanism (respiration, phonation, resonance, articulation, prosody) as well as impact of each on intelligibility in patient's speech. She expressed understanding of information provided as evidenced by asking good questions.     Home program established: yes-diaphragmatic breathing and production of simple, personally relevant words for coordination of respiration and phonation  Exercises were reviewed and  JEANNETTE was able to demonstrate them prior to the end of the session.  JEANNETTE demonstrated fair  understanding of the education provided.     See Electronic Medical Record under Patient Instructions for exercises provided throughout therapy.  Assessment:   JEANNETTE is progressing well towards his goals. Improvement over course of session with gains in intelligibility given use of breath support to coordinate for speech. Current goals remain appropriate. Goals to be updated as necessary.     Patient prognosis is Good. Patient will continue to benefit from skilled outpatient speech and language therapy to address the deficits listed in the problem list on initial evaluation, provide patient/family education and to maximize patient's level of independence in the home and community environment.     Medical necessity is demonstrated by the following IMPAIRMENTS:  Dysarthria and articulation deficits impact patient's ability to communicate effectively in all medical, social, home environments safely and efficiently.     Barriers to Therapy: NA  Patient's spiritual, cultural and educational needs considered and patient agreeable to plan of care and goals.  Plan:   Continue Plan of Care with focus on diaphragmatic breathing, coordination of breath support for phonation and speech and articulation goals targeting /t/, /d/,/ k/, and /g/.    Diana Burton MA, CCC-SLP  Speech Language Pathologist  1/24/2023

## 2023-01-25 DIAGNOSIS — Q90.9 DOWN'S SYNDROME: Primary | ICD-10-CM

## 2023-01-25 DIAGNOSIS — R13.19 OTHER DYSPHAGIA: ICD-10-CM

## 2023-01-25 DIAGNOSIS — R13.11 ORAL MOTOR DYSFUNCTION: ICD-10-CM

## 2023-01-31 ENCOUNTER — CLINICAL SUPPORT (OUTPATIENT)
Dept: REHABILITATION | Facility: HOSPITAL | Age: 33
End: 2023-01-31
Payer: MEDICARE

## 2023-01-31 DIAGNOSIS — R13.10 DYSPHAGIA, UNSPECIFIED TYPE: ICD-10-CM

## 2023-01-31 DIAGNOSIS — R47.1 DYSARTHRIA: ICD-10-CM

## 2023-01-31 DIAGNOSIS — F80.0 SPEECH SOUND DISORDER: Primary | ICD-10-CM

## 2023-01-31 PROCEDURE — 92507 TX SP LANG VOICE COMM INDIV: CPT | Performed by: SPEECH-LANGUAGE PATHOLOGIST

## 2023-01-31 NOTE — PROGRESS NOTES
[facility]  Speech Therapy Treatment Note- Dysphagia and Speech  Date: 1/31/2023     Name: Isaías Nelson   MRN: [id number]   Therapy Diagnosis:   Encounter Diagnoses   Name Primary?    Speech sound disorder Yes    Dysphagia, unspecified type     Dysarthria      Physician: Latrell Webb MD  Physician Orders: Ambulatory referral/consult to speech therapy   Medical Diagnosis: Pharyngoesophageal dysphagia [R13.14], Oral motor dysfunction [R13.11]    Visit #/ Visits Authorized: 4/ 25 (+eval)  Date of Evaluation:  1/3/23  Insurance Authorization Period: 1/3/2023 - 1/3/2024  Plan of Care Expiration Date:    3/14/23  Extended Plan of Care:  -   Progress Note: 2/3/23   Visits Cancelled: 0  Visits No Show: 0    Time In:  3:20 PM  Time Out:  4:10 PM  Total Billable Time: 50 mins     Precautions: Standard, Communication, and Reduced health literacy   Subjective:   Patient reports: he presents on time with his caregiver, Bozena. He went bowling this week at camp. Difficulty scheduling MBSS secondary to diagnosis code associated with order/insurance approval. Working to schedule.   He was compliant to home exercise program.     Response to previous treatment: use of dysarthria strategies for personally relevant words     Pain Scale:  0/10 on a Visual Analog Scale currently.   Pain Location: NA  Objective:   TIMED  Procedure Min.   Cognitive Therapeutic Interventions, first 15 minutes CPT 64230  0   Cognitive Therapeutic Interventions, each additional 15 minutes CPT 17757  0         UNTIMED  Procedure Min.   Speech- Language- Voice Therapy  50   Dysphagia Therapy  0   Total Timed Units: 0  Total Untimed Units: 1  Charges Billed/Number of units: 04133/1    Short Term Goals: (5 weeks) Current Progress:   Patient will complete diaphragmatic breathing exercises by producing maximal exhalation via diaphragmic breathing with a duration of 5 seconds in 10 trials, consistently with minimal tactile and verbal clinician  "cues.     Progressing/ Not Met 1/31/2023   Not formally addressed        In order to promote generalization of intelligibility, patient will produce sentences during diaphragmatic breathing while incorporating intelligibility strategies (overarticulation, increased loudness) at 80% accuracy with moderate verbal and visual cues.      Progressing/ Not Met 1/31/2023   Patient completed (with visual, verbal, tactile and motor cues) coordination of diaphragmatic breath for phonation with improved loudness and therefore overall intelligibility. He required maximum cueing for coordination of respiration and phonation with ~45% accuracy in today's session for simple words such as his name "Ari", "hey", "Diana", "Thania", "Bozena", "Meño", "Mickey", "Erika", etc. Will continue to address and patient and his caregiver encouraged to practice daily at home. Significant improvement in loudness and intelligibility given coordination of breathing for speech. Additionally, progress toward working outside of therapy room today in gym by having patient call for others to get their attention in louder environment.     Patient will complete x3-5 rounds of PhoRTE exercises within a session with min cues to increase breath support and loudness.      Progressing/ Not Met 1/31/2023   Not formally addressed      Patient will rate his speech while reading sentences as at least a 2 on a 3 point scale (1=same as before beginning therapy, 2=better but not best, 3=best possible outcome) on 9 /10 trials given auditory feedback.      Progressing/ Not Met 1/31/2023   Not formally addressed      In order to increase awareness of dysarthria, patient will self-monitor phrases and sentences produced by patient and clinician for varied intonation with minimal cues.     Progressing/ Not Met 1/31/2023   Not formally addressed      In order to increase awareness of dysarthria, patient will self-monitor phrases and sentences for intelligibility using " minimal verbal cues.      Progressing/ Not Met 1/31/2023   Not formally addressed      Patient will implement increased intonation, overarticulation, and reduced rate strategies to increase overall intelligibility for functional communication with 80% accuracy and minimal verbal cues for use of strategies.      Progressing/ Not Met 1/31/2023   Not formally addressed      Patient and/or family will be educated on nature of dysarthria, and rationale for completion of exercises/use of strategies with returned demonstration of information.     Goal Met 1/10/2023   Patient's mother educated extensively regarding nature of dysarthria and domains involved in speech mechanism (respiration, phonation, resonance, articulation, prosody) as well as impact of each on intelligibility in patient's speech. She expressed understanding of information provided as evidenced by asking good questions.    Patient will produce /g/ at the word level in all positions of words with 90% accuracy and minimal cueing over 3 sessions.     Progressing/ Not Met 1/31/2023   Not formally addressed     Patient will produce /k/ at the word level in all positions of words with 90% accuracy and minimal cueing over 3 sessions.    Progressing/ Not Met 1/31/2023    /k/ initial: Met x1 /k/ initial: 100% accuracy, independently met x1  /k/ medial: 88% accuracy, minimal cueing   /k/ final: 70% accuracy, minimal cueing    Patient will produce /t/ at the word level in all positions of words with 90% accuracy and minimal cueing over 3 sessions.      /t/ initial: 94% accuracy, independently met x1  /t/ medial: 86% accuracy, minimal cueing   /t/ final: 62% accuracy, mod verbal cueing for final consonant deletion     Progressing/ Not Met 1/31/2023    /t/ initial: Met x1  Not formally addressed        Patient will produce /d/ at the word level in all positions of words with 90% accuracy and minimal cueing over 3 sessions.     /d/ initial: 79% accuracy, mod verbal  cueing  /d/ medial: 75% accuracy, mod verbal cueing    /d/ final: 53% accuracy, final consonant devoicing but stimulable 100% of time given cues     Progressing/ Not Met 1/31/2023   Not formally addressed      Patient will produce /f/ at the word level in all positions of words with 90% accuracy and minimal cueing over 3 sessions.     Progressing/ Not Met 1/31/2023   Not formally addressed   Patient will produce /v/ at the word level in all positions of words with 90% accuracy and minimal cueing over 3 sessions.     Progressing/ Not Met 1/31/2023   Not formally addressed   Patient will produce voiced /th/ at the word level in all positions of words with 90% accuracy and minimal cueing over 3 sessions.     Progressing/ Not Met 1/31/2023   Not formally addressed   Patient will produce voiceless /th/ at the word level in all positions of words with 90% accuracy and minimal cueing over 3 sessions.    Progressing/ Not Met 1/31/2023   Not formally addressed   Patient will produce /z/ at the word level in all positions of words with 90% accuracy and minimal cueing over 3 sessions.    Progressing/ Not Met 1/31/2023   Not formally addressed   Patient will produce /r/ blends at the word level in all positions of words with 90% accuracy and minimal cueing over 3 sessions.    Progressing/ Not Met 1/31/2023   Not formally addressed   Patient will produce /l/ blends at the word level in all positions of words with 90% accuracy and minimal cueing over 3 sessions.    Progressing/ Not Met 1/31/2023   Not formally addressed       Patient Education/Response:   Patient's mother educated extensively regarding nature of dysarthria and domains involved in speech mechanism (respiration, phonation, resonance, articulation, prosody) as well as impact of each on intelligibility in patient's speech. She expressed understanding of information provided as evidenced by asking good questions.     Home program established: yes-diaphragmatic  breathing and production of simple, personally relevant words for coordination of respiration and phonation  Exercises were reviewed and JEANNETTE was able to demonstrate them prior to the end of the session.  JEANNETTE demonstrated fair  understanding of the education provided.     See Electronic Medical Record under Patient Instructions for exercises provided throughout therapy.  Assessment:   JEANNETTE is progressing well towards his goals. Improvement over course of session with gains in intelligibility given use of breath support to coordinate for speech in louder/dynamic and new environment in today's session. Progress made toward production of /k/ in initial position of single words.  Current goals remain appropriate. Goals to be updated as necessary.     Patient prognosis is Good. Patient will continue to benefit from skilled outpatient speech and language therapy to address the deficits listed in the problem list on initial evaluation, provide patient/family education and to maximize patient's level of independence in the home and community environment.     Medical necessity is demonstrated by the following IMPAIRMENTS:  Dysarthria and articulation deficits impact patient's ability to communicate effectively in all medical, social, home environments safely and efficiently.     Barriers to Therapy: NA  Patient's spiritual, cultural and educational needs considered and patient agreeable to plan of care and goals.  Plan:   Continue Plan of Care with focus on diaphragmatic breathing, coordination of breath support for phonation and speech and articulation goals targeting /t/, /d/,/ k/, and /g/.    GLOYR Early  Student Speech Language Pathologist    Diana Burton MA, CCC-SLP  Speech Language Pathologist  1/31/2023

## 2023-02-07 ENCOUNTER — CLINICAL SUPPORT (OUTPATIENT)
Dept: REHABILITATION | Facility: HOSPITAL | Age: 33
End: 2023-02-07
Payer: MEDICARE

## 2023-02-07 DIAGNOSIS — F80.0 SPEECH SOUND DISORDER: Primary | ICD-10-CM

## 2023-02-07 DIAGNOSIS — R13.10 DYSPHAGIA, UNSPECIFIED TYPE: ICD-10-CM

## 2023-02-07 DIAGNOSIS — R47.1 DYSARTHRIA: ICD-10-CM

## 2023-02-07 PROCEDURE — 92507 TX SP LANG VOICE COMM INDIV: CPT | Performed by: SPEECH-LANGUAGE PATHOLOGIST

## 2023-02-07 NOTE — PROGRESS NOTES
[facility]  Speech Therapy Treatment Note- Dysphagia and Speech  Date: 2/7/2023     Name: Isaías Nelson   MRN: [id number]   Therapy Diagnosis:   Encounter Diagnoses   Name Primary?    Speech sound disorder Yes    Dysphagia, unspecified type     Dysarthria    Physician: Latrell Webb MD  Physician Orders: Ambulatory referral/consult to speech therapy   Medical Diagnosis: Pharyngoesophageal dysphagia [R13.14], Oral motor dysfunction [R13.11]    Visit #/ Visits Authorized: 5/ 25 (+eval)  Date of Evaluation:  1/3/23  Insurance Authorization Period: 1/3/2023 - 1/3/2024  Plan of Care Expiration Date:    3/14/23  Extended Plan of Care:  -   Progress Note: 2/3/23   Visits Cancelled: 0  Visits No Show: 0    Time In:  3:15 PM  Time Out:  4:06 PM  Total Billable Time: 51 mins     Precautions: Standard, Communication, and Reduced health literacy   Subjective:   Patient reports: he presents on time with his father. He played kickball today at camp. Difficulty scheduling MBSS secondary to diagnosis code associated with order/insurance approval. Working to schedule.    He was compliant to home exercise program.     Response to previous treatment: use of dysarthria strategies for personally relevant words     Pain Scale:  0/10 on a Visual Analog Scale currently.   Pain Location: NA  Objective:   TIMED  Procedure Min.   Cognitive Therapeutic Interventions, first 15 minutes CPT 47719  0   Cognitive Therapeutic Interventions, each additional 15 minutes CPT 06473  0         UNTIMED  Procedure Min.   Speech- Language- Voice Therapy  51   Dysphagia Therapy  0   Total Timed Units: 0  Total Untimed Units: 1  Charges Billed/Number of units: 59263/1    Short Term Goals: (5 weeks) Current Progress:   Patient will complete diaphragmatic breathing exercises by producing maximal exhalation via diaphragmic breathing with a duration of 5 seconds in 10 trials, consistently with minimal tactile and verbal clinician cues.  "    Progressing/ Not Met 2/7/2023   Not formally addressed        In order to promote generalization of intelligibility, patient will produce sentences during diaphragmatic breathing while incorporating intelligibility strategies (overarticulation, increased loudness) at 80% accuracy with moderate verbal and visual cues.      Progressing/ Not Met 2/7/2023   Patient completed (with visual, verbal, tactile and motor cues) coordination of diaphragmatic breath for phonation with significantly improved loudness and therefore overall intelligibility. He required maximum cueing for coordination of respiration and phonation with ~50% accuracy in today's session for simple words such as his name "Ari", "hey", "Diana", "Thania", "dad", "stop barking", "Dakota", "beast", "Isabel", etc. Will continue to address and patient and his father encouraged to practice daily at home. Significant improvement in loudness and intelligibility given coordination of breathing for speech. Additionally, progress toward working outside of therapy room today by going outside of the clinic which greatly improved patient use of strategies resulting in increased loudness and overall intelligibility.      Patient will complete x3-5 rounds of PhoRTE exercises within a session with min cues to increase breath support and loudness.      Progressing/ Not Met 2/7/2023   Not formally addressed      Patient will rate his speech while reading sentences as at least a 2 on a 3 point scale (1=same as before beginning therapy, 2=better but not best, 3=best possible outcome) on 9 /10 trials given auditory feedback.      Progressing/ Not Met 2/7/2023   Not formally addressed      In order to increase awareness of dysarthria, patient will self-monitor phrases and sentences produced by patient and clinician for varied intonation with minimal cues.     Progressing/ Not Met 2/7/2023   Not formally addressed      In order to increase awareness of dysarthria, patient " will self-monitor phrases and sentences for intelligibility using minimal verbal cues.      Progressing/ Not Met 2/7/2023   Not formally addressed      Patient will implement increased intonation, overarticulation, and reduced rate strategies to increase overall intelligibility for functional communication with 80% accuracy and minimal verbal cues for use of strategies.      Progressing/ Not Met 2/7/2023   Not formally addressed      Patient and/or family will be educated on nature of dysarthria, and rationale for completion of exercises/use of strategies with returned demonstration of information.     Goal Met 1/10/2023   Patient's mother educated extensively regarding nature of dysarthria and domains involved in speech mechanism (respiration, phonation, resonance, articulation, prosody) as well as impact of each on intelligibility in patient's speech. She expressed understanding of information provided as evidenced by asking good questions.    Patient will produce /g/ at the word level in all positions of words with 90% accuracy and minimal cueing over 3 sessions.     Progressing/ Not Met 2/7/2023   Not formally addressed     Patient will produce /k/ at the word level in all positions of words with 90% accuracy and minimal cueing over 3 sessions.    /k/ initial: 100% accuracy, independently met x1  /k/ medial: 88% accuracy, minimal cueing   /k/ final: 70% accuracy, minimal cueing     Progressing/ Not Met 2/7/2023    /k/ initial: Met x2 /k/ initial: 100% accuracy, independently met x1  /k/ medial: 79% accuracy, minimal cueing   /k/ final: 69% accuracy, minimal cueing    Patient will produce /t/ at the word level in all positions of words with 90% accuracy and minimal cueing over 3 sessions.      /t/ initial: 94% accuracy, independently met x1  /t/ medial: 86% accuracy, minimal cueing   /t/ final: 62% accuracy, mod verbal cueing for final consonant deletion     Progressing/ Not Met 2/7/2023    /t/ initial: Met x1   Not formally addressed        Patient will produce /d/ at the word level in all positions of words with 90% accuracy and minimal cueing over 3 sessions.     /d/ initial: 79% accuracy, mod verbal cueing  /d/ medial: 75% accuracy, mod verbal cueing    /d/ final: 53% accuracy, final consonant devoicing but stimulable 100% of time given cues     Progressing/ Not Met 2/7/2023   Not formally addressed      Patient will produce /f/ at the word level in all positions of words with 90% accuracy and minimal cueing over 3 sessions.     Progressing/ Not Met 2/7/2023   Not formally addressed   Patient will produce /v/ at the word level in all positions of words with 90% accuracy and minimal cueing over 3 sessions.     Progressing/ Not Met 2/7/2023   Not formally addressed   Patient will produce voiced /th/ at the word level in all positions of words with 90% accuracy and minimal cueing over 3 sessions.     Progressing/ Not Met 2/7/2023   Not formally addressed   Patient will produce voiceless /th/ at the word level in all positions of words with 90% accuracy and minimal cueing over 3 sessions.    Progressing/ Not Met 2/7/2023   Not formally addressed   Patient will produce /z/ at the word level in all positions of words with 90% accuracy and minimal cueing over 3 sessions.    Progressing/ Not Met 2/7/2023   Not formally addressed   Patient will produce /r/ blends at the word level in all positions of words with 90% accuracy and minimal cueing over 3 sessions.    Progressing/ Not Met 2/7/2023   Not formally addressed   Patient will produce /l/ blends at the word level in all positions of words with 90% accuracy and minimal cueing over 3 sessions.    Progressing/ Not Met 2/7/2023   Not formally addressed       Patient Education/Response:   Patient's mother educated extensively regarding nature of dysarthria and domains involved in speech mechanism (respiration, phonation, resonance, articulation, prosody) as well as impact of each  on intelligibility in patient's speech. She expressed understanding of information provided as evidenced by asking good questions.     Home program established: yes-diaphragmatic breathing and production of simple, personally relevant words for coordination of respiration and phonation  Exercises were reviewed and JEANNETTE was able to demonstrate them prior to the end of the session.  JEANNETTE demonstrated fair  understanding of the education provided.     See Electronic Medical Record under Patient Instructions for exercises provided throughout therapy.  Assessment:   JEANNETTE is progressing well towards his goals. Improvement over course of session with gains in intelligibility given use of breath support to coordinate for speech in louder/dynamic and new environment in today's session. Patient benefited from participating in therapy outside of the clinic today, which appeared to make him more comfortable when utilizing diaphragmatic strategies to produce simple words. Progress made toward production of /k/ in initial position of single words. Current goals remain appropriate. Goals to be updated as necessary.     Patient prognosis is Good. Patient will continue to benefit from skilled outpatient speech and language therapy to address the deficits listed in the problem list on initial evaluation, provide patient/family education and to maximize patient's level of independence in the home and community environment.     Medical necessity is demonstrated by the following IMPAIRMENTS:  Dysarthria and articulation deficits impact patient's ability to communicate effectively in all medical, social, home environments safely and efficiently.     Barriers to Therapy: NA  Patient's spiritual, cultural and educational needs considered and patient agreeable to plan of care and goals.  Plan:   Continue Plan of Care with focus on diaphragmatic breathing, coordination of breath support for phonation and speech and articulation  goals targeting /t/, /d/,/ k/, and /g/.    GLORY Early  Student Speech Language Pathologist    Diana Burton MA, CCC-SLP  Speech Language Pathologist  2/7/2023

## 2023-02-13 DIAGNOSIS — Q90.9 DOWN SYNDROME: Primary | ICD-10-CM

## 2023-02-14 ENCOUNTER — CLINICAL SUPPORT (OUTPATIENT)
Dept: REHABILITATION | Facility: HOSPITAL | Age: 33
End: 2023-02-14
Payer: MEDICARE

## 2023-02-14 DIAGNOSIS — R13.10 DYSPHAGIA, UNSPECIFIED TYPE: ICD-10-CM

## 2023-02-14 DIAGNOSIS — R47.1 DYSARTHRIA: ICD-10-CM

## 2023-02-14 DIAGNOSIS — F80.0 SPEECH SOUND DISORDER: Primary | ICD-10-CM

## 2023-02-14 PROCEDURE — 92507 TX SP LANG VOICE COMM INDIV: CPT | Performed by: SPEECH-LANGUAGE PATHOLOGIST

## 2023-02-14 NOTE — PROGRESS NOTES
[facility]  Speech Therapy Treatment Note- Dysphagia and Speech  Date: 2/14/2023     Name: Isaías Nelson   MRN: [id number]   Therapy Diagnosis:   Encounter Diagnoses   Name Primary?    Speech sound disorder Yes    Dysphagia, unspecified type     Dysarthria    Physician: Latrell Webb MD  Physician Orders: Ambulatory referral/consult to speech therapy   Medical Diagnosis: Pharyngoesophageal dysphagia [R13.14], Oral motor dysfunction [R13.11]    Visit #/ Visits Authorized: 6/ 25 (+eval)  Date of Evaluation:  1/3/23  Insurance Authorization Period: 1/3/2023 - 1/3/2024  Plan of Care Expiration Date:    3/14/23  Extended Plan of Care:  -   Progress Note: 2/3/23   Visits Cancelled: 0  Visits No Show: 0    Time In:  3:25 PM  Time Out:  4:20 PM  Total Billable Time: 55 mins     Precautions: Standard, Communication, and Reduced health literacy   Subjective:   Patient reports: he presents on time with his caregiver. Today is patient's birthday and he celebrated at camp by watching a movie.    He was compliant to home exercise program.     Response to previous treatment: use of dysarthria strategies for personally relevant words     Pain Scale:  0/10 on a Visual Analog Scale currently.   Pain Location: NA  Objective:   TIMED  Procedure Min.   Cognitive Therapeutic Interventions, first 15 minutes CPT 34399  0   Cognitive Therapeutic Interventions, each additional 15 minutes CPT 39787  0         UNTIMED  Procedure Min.   Speech- Language- Voice Therapy  55   Dysphagia Therapy  0   Total Timed Units: 0  Total Untimed Units: 1  Charges Billed/Number of units: 76224/1    Short Term Goals: (5 weeks) Current Progress:   Patient will complete diaphragmatic breathing exercises by producing maximal exhalation via diaphragmic breathing with a duration of 5 seconds in 10 trials, consistently with minimal tactile and verbal clinician cues.     Progressing/ Not Met 2/14/2023   Not formally addressed        In order to  "promote generalization of intelligibility, patient will produce sentences during diaphragmatic breathing while incorporating intelligibility strategies (overarticulation, increased loudness) at 80% accuracy with moderate verbal and visual cues.      Progressing/ Not Met 2/14/2023   Patient completed (with visual, verbal, tactile and motor cues) coordination of diaphragmatic breath for phonation with significantly improved loudness and therefore overall intelligibility. He required maximum cueing for coordination of respiration and phonation with ~55% accuracy in today's session for simple words such as his name "Ari", "hey", "Diana", "Thania", "Bozena", and words targeting production of /d/. Will continue to address and patient and his family encouraged to practice daily at home. Significant improvement in loudness and intelligibility given coordination of breathing for speech. Patient increased loudness significantly by singing personally relevant ([place] songs) during today's session.      Patient will complete x3-5 rounds of PhoRTE exercises within a session with min cues to increase breath support and loudness.      Progressing/ Not Met 2/14/2023   Not formally addressed      Patient will rate his speech while reading sentences as at least a 2 on a 3 point scale (1=same as before beginning therapy, 2=better but not best, 3=best possible outcome) on 9 /10 trials given auditory feedback.      Progressing/ Not Met 2/14/2023   Not formally addressed      In order to increase awareness of dysarthria, patient will self-monitor phrases and sentences produced by patient and clinician for varied intonation with minimal cues.     Progressing/ Not Met 2/14/2023   Not formally addressed      In order to increase awareness of dysarthria, patient will self-monitor phrases and sentences for intelligibility using minimal verbal cues.      Progressing/ Not Met 2/14/2023   Not formally addressed      Patient will implement " increased intonation, overarticulation, and reduced rate strategies to increase overall intelligibility for functional communication with 80% accuracy and minimal verbal cues for use of strategies.      Progressing/ Not Met 2/14/2023   Not formally addressed      Patient and/or family will be educated on nature of dysarthria, and rationale for completion of exercises/use of strategies with returned demonstration of information.     Goal Met 1/10/2023   Patient's mother educated extensively regarding nature of dysarthria and domains involved in speech mechanism (respiration, phonation, resonance, articulation, prosody) as well as impact of each on intelligibility in patient's speech. She expressed understanding of information provided as evidenced by asking good questions.    Patient will produce /g/ at the word level in all positions of words with 90% accuracy and minimal cueing over 3 sessions.     Progressing/ Not Met 2/14/2023   Not formally addressed     Patient will produce /k/ at the word level in all positions of words with 90% accuracy and minimal cueing over 3 sessions.    /k/ initial: 100% accuracy, independently met x1  /k/ medial: 88% accuracy, minimal cueing   /k/ final: 70% accuracy, minimal cueing     Progressing/ Not Met 2/14/2023    /k/ initial: Met x2 Not formally addressed   Patient will produce /t/ at the word level in all positions of words with 90% accuracy and minimal cueing over 3 sessions.      /t/ initial: 94% accuracy, independently met x1  /t/ medial: 86% accuracy, minimal cueing   /t/ final: 62% accuracy, mod verbal cueing for final consonant deletion     Progressing/ Not Met 2/14/2023    /t/ initial: Met x1  Not formally addressed        Patient will produce /d/ at the word level in all positions of words with 90% accuracy and minimal cueing over 3 sessions.     /d/ initial: 79% accuracy, mod verbal cueing met x1  /d/ medial: 75% accuracy, mod verbal cueing    /d/ final: 53% accuracy,  final consonant devoicing but stimulable 100% of time given cues     Progressing/ Not Met 2/14/2023   /d/ initial: 100% accuracy, minimal verbal cueing  /d/ medial: 75% accuracy, mod verbal cueing   /d/ final: 57% accuracy, mod verbal cueing      Patient will produce /f/ at the word level in all positions of words with 90% accuracy and minimal cueing over 3 sessions.     Progressing/ Not Met 2/14/2023   Not formally addressed   Patient will produce /v/ at the word level in all positions of words with 90% accuracy and minimal cueing over 3 sessions.     Progressing/ Not Met 2/14/2023   Not formally addressed   Patient will produce voiced /th/ at the word level in all positions of words with 90% accuracy and minimal cueing over 3 sessions.     Progressing/ Not Met 2/14/2023   Not formally addressed   Patient will produce voiceless /th/ at the word level in all positions of words with 90% accuracy and minimal cueing over 3 sessions.    Progressing/ Not Met 2/14/2023   Not formally addressed   Patient will produce /z/ at the word level in all positions of words with 90% accuracy and minimal cueing over 3 sessions.    Progressing/ Not Met 2/14/2023   Not formally addressed   Patient will produce /r/ blends at the word level in all positions of words with 90% accuracy and minimal cueing over 3 sessions.    Progressing/ Not Met 2/14/2023   Not formally addressed   Patient will produce /l/ blends at the word level in all positions of words with 90% accuracy and minimal cueing over 3 sessions.    Progressing/ Not Met 2/14/2023   Not formally addressed       Patient Education/Response:   Patient's mother educated extensively regarding nature of dysarthria and domains involved in speech mechanism (respiration, phonation, resonance, articulation, prosody) as well as impact of each on intelligibility in patient's speech. She expressed understanding of information provided as evidenced by asking good questions.     Home program  established: yes-diaphragmatic breathing and production of simple, personally relevant words for coordination of respiration and phonation  Exercises were reviewed and JEANNETTE was able to demonstrate them prior to the end of the session.  JEANNETTE demonstrated fair  understanding of the education provided.     See Electronic Medical Record under Patient Instructions for exercises provided throughout therapy.  Assessment:   JEANNETTE is progressing well towards his goals. Improvement over course of session with gains in intelligibility given use of breath support to coordinate for speech today. Patient benefited from singing his favorite [place] songs today during session to increase loudness. Demonstrated progress in production of /d/ in all positions. Current goals remain appropriate. Goals to be updated as necessary.     Patient prognosis is Good. Patient will continue to benefit from skilled outpatient speech and language therapy to address the deficits listed in the problem list on initial evaluation, provide patient/family education and to maximize patient's level of independence in the home and community environment.     Medical necessity is demonstrated by the following IMPAIRMENTS:  Dysarthria and articulation deficits impact patient's ability to communicate effectively in all medical, social, home environments safely and efficiently.     Barriers to Therapy: NA  Patient's spiritual, cultural and educational needs considered and patient agreeable to plan of care and goals.  Plan:   Continue Plan of Care with focus on diaphragmatic breathing, coordination of breath support for phonation and speech and articulation goals targeting /t/, /d/,/ k/, and /g/.    Savannah Whitley BA  Student Speech Language Pathologist    Diana Burton MA, CCC-SLP  Speech Language Pathologist  2/14/2023

## 2023-02-22 ENCOUNTER — PATIENT MESSAGE (OUTPATIENT)
Dept: REHABILITATION | Facility: HOSPITAL | Age: 33
End: 2023-02-22
Payer: MEDICAID

## 2023-02-23 ENCOUNTER — PATIENT MESSAGE (OUTPATIENT)
Dept: REHABILITATION | Facility: HOSPITAL | Age: 33
End: 2023-02-23
Payer: MEDICAID

## 2023-02-27 ENCOUNTER — PATIENT MESSAGE (OUTPATIENT)
Dept: REHABILITATION | Facility: HOSPITAL | Age: 33
End: 2023-02-27

## 2023-02-27 ENCOUNTER — CLINICAL SUPPORT (OUTPATIENT)
Dept: REHABILITATION | Facility: HOSPITAL | Age: 33
End: 2023-02-27
Payer: MEDICARE

## 2023-02-27 ENCOUNTER — HOSPITAL ENCOUNTER (OUTPATIENT)
Dept: RADIOLOGY | Facility: HOSPITAL | Age: 33
Discharge: HOME | End: 2023-02-27
Attending: OTOLARYNGOLOGY
Payer: MEDICARE

## 2023-02-27 DIAGNOSIS — R63.30 FEEDING DIFFICULTIES: ICD-10-CM

## 2023-02-27 PROCEDURE — 92611 MOTION FLUOROSCOPY/SWALLOW: CPT | Performed by: SPEECH-LANGUAGE PATHOLOGIST

## 2023-02-27 PROCEDURE — 25500020 PHARM REV CODE 255

## 2023-02-27 PROCEDURE — 74230 X-RAY XM SWLNG FUNCJ C+: CPT | Mod: TC

## 2023-02-27 PROCEDURE — 74230 X-RAY XM SWLNG FUNCJ C+: CPT | Mod: 26,,, | Performed by: RADIOLOGY

## 2023-02-27 PROCEDURE — 74230 FL MODIFIED BARIUM SWALLOW SPEECH STUDY: ICD-10-PCS | Mod: 26,,, | Performed by: RADIOLOGY

## 2023-02-27 PROCEDURE — A9698 NON-RAD CONTRAST MATERIALNOC: HCPCS

## 2023-02-27 RX ADMIN — BARIUM SULFATE 68 ML: 0.81 POWDER, FOR SUSPENSION ORAL at 02:02

## 2023-02-27 NOTE — PLAN OF CARE
"[facility]   Outpatient MODIFIED BARIUM SWALLOW STUDY    Date: 2/27/2023     Name: Isaías Nelson   MRN: [id number]    Therapy Diagnosis: WFL oral and pharyngeal phases of the swallow    Physician: Latrell Webb MD  Physician Orders: Fl Modified Barium Swallow Speech/SLP Video Swallow  Medical Diagnosis from Referral: R63.30 (ICD-10-CM) - Feeding difficulties    Date of Evaluation:  2/27/2023    Procedure Min.   Fl Modified Barium Swallow Speech  35     Time in: 1:45 PM  Time out: 2:20 PM  Total Billable Time: 35 minutes    Radiation time: 3.9 minutes    Precautions: Standard  Subjective   History of Current Condition: Isaías Nelson is a 33 y.o. male here today for Modified Barium Swallow Study (MBSS). He presents on time and with his father for appointment today. Family provided case history as well as was taken from chart review. Patient's medical history is significant for Down syndrome. Patient's mother reports years long history of food "getting stuck" during the swallow as well as gagging with eventual expectoration of foods. She also reports inconsistent coughing with liquids with patient reporting "it went the wrong way". Chart review also significant for ~20 lb weight loss in last few years. Patient's mother reports most recent swallow study was completed ~3 years ago. Chart review significant for MBSS 8/10/20 with overall functional swallow and no recommendation for therapy. Patient's mother also reports overall significantly slow movements/actions such as feeding himself, walking, talking, closing doors, etc.     History was provided by family, and/or taken from chart review:   -Current diet at home: thin liquids (IDDSI 0) and regular solids (IDDSI 7)  -Recommended diet from previous study: thin liquids (IDDSI 0) and regular solids (IDDSI 7)  -Therapy received: currently receiving speech therapy  -Neurological: Pt endorsed neurological diagnosis of Down Syndrome. Pt denied all " "other neurological diagnoses.  -Gastroenterologist (GI): Pt denied any GI diagnoses.    -Pulmonary: Pt denied any pulmonary diagnoses.   -Surgery: N/A  -Cancer: N/A    The following observations were made:   -Mental status: Alert and Cooperative  -Factors affecting performance: no difficulties participating in the study  -Feeding Method: independent in self-feeding    Respiratory Status:   -Respiratory Status: room air    Past Medical History: Isaías Nelson  has no past medical history on file.  Isaías Nelson  has no past surgical history on file.    Medical Hx and Allergies: Review of patient's allergies indicates:  No Known Allergies    Relevant Imaging:  [facility]    Objective     Modified Barium Swallow Study  Purpose: to evaluate anatomy and physiology of the oropharyngeal swallow, to determine effectiveness of rehabilitation strategies, and to determine diet consistency and intervention recommendations. The study was performed using the "Gold Standard" of 30 fps with as low as reasonably achievable (ALARA) exposure.     The patient was seen in radiology seated in High Chavez's position in a video imaging chair for lateral views of the larynx and an A/P view. The study was conducted using Varibar thin liquid (IDDSI 0), Varibar nectar liquid (IDDSI 2), Varibar pudding (IDDSI 4), Peaches mixed with Varibar thin liquid (IDDSI 6/0), and solid coated in Varibar pudding (IDDSI 7). He tolerated the procedure well.    A cranial nerve evaluation revealed:   Cranial Nerve Examination  Cranial Nerve 5: Trigeminal Nerve  Motor Jaw Posture at rest: Closed  Mandible Elevation/Depression: WFL  Mandible lateralization: WFL  Abnormal movement: absent Interpretation: Intact bilaterally    Sensory Forehead: WFL  Cheek: WFL  Jaw: WFL  Facial Pain: None noted Interpretation: Intact bilaterally      Cranial Nerve 7: Facial Nerve  Motor Facial Symmetry: WNL  Wrinkle Forehead: WFL  Close eyes tightly: WFL  Labial " Protrusion: WFL  Labial Retraction: WFL  Buccal Strength with Labial Seal: WFL  Abnormal movement: absent Interpretation: Intact bilaterally    Sensory Formal testing not completed.       Cranial Nerves IX and X: Glossopharyngeal and Vagus Nerves  Motor Palatal Symmetry (Rest): WNL  Palatal Symmetry (Movement): WNL  Cough: Perceptually strong  Voice Prior to PO intake: Clear  Resonance: Normal  Abnormal movement: absent Interpretation: Intact bilaterally      Cranial Nerve XII: Hypoglossal Nerve  Motor Tongue at rest: WNL  Lingual Protrusion: WNL  Lingual Protrusion against Resistance: WNL  Lingual Lateralization: WNL  Abnormal movement: absent Interpretation: Intact bilaterally      Other information:  Volitional Swallow: Able to palpate laryngeal rise  Mucosal Quality: No abnormal findings  Secretion Management: no overt deficits noted/observed  Dentition: Good condition for speech and mastication        Consistency  Presentation  Findings Strategy Attempted Rosenbeck's Penetration/Aspiration Scale (PAS)   Thin (IDDSI 0) Method: Self-fed    Volume: cup sip x5, sequential sips    Projection: lateral view; AP view  Oral phase: piecemeal deglutition     Pharyngeal phase: trace to minimal valleculae and pyriform sinus residue, clears with spontaneous sequential swallow    Esophageal screen: delayed emptying, retrograde flow from distal to cervical esophagus, aerophagia, and belching observed in AP view  Best: (1) Material does not enter the airway    Worst: (1) Material does not enter the airway     Nectar thick (mildly thick/IDDSI 2) Method:Self-fed    Volume: cup sip x2     Projection: lateral view Oral phase: piecemeal deglutition     Pharyngeal phase: minimal to moderate BOT/valleculae/pyriform sinus residue, clears with spontaneous sequential swallow      Best: (1) Material does not enter the airway    Worst: (1) Material does not enter the airway     Puree (extremely thick/ IDDSI 4) Method: Self-fed    Volume:  tbsp bite x4     Projection: lateral view; AP view  Oral phase: WFL    Pharyngeal phase: trace valleculae residue, clears with spontaneous sequential swallow    Esophageal screen: retention/retrograde flow in cervical esophagus, delayed emptying observed in AP view    Best: (1) Material does not enter the airway    Worst: (1) Material does not enter the airway     Solid (regular/ IDDSI 7) Method: Self-fed    Volume: bite x2     Projection: lateral view; AP view  Oral phase: slow mastication    Pharyngeal phase: trace residue at pyriform sinuses  Best: (1) Material does not enter the airway    Worst: (1) Material does not enter the airway     Mixed consistency (thin/ IDDSI 0 + soft and bite sized/ IDDSI 6) Method: Self-fed    Volume: bite x2     Projection: lateral view Oral phase: slow mastication    Pharyngeal phase: WFL  Best: (1) Material does not enter the airway    Worst: (1) Material does not enter the airway     Barium tablet  Method: Self-fed    Volume: single tablet with water bolus(es)    Projection: lateral view Timely and efficient oropharyngeal clearance    On AP view, barium tablet was stuck in distal esophagus          Treatment   Treatment Time In: n/a  Treatment Time Out: n/a  Total Treatment Time: n/a  Patient educated regarding results and recommendations of the evaluation. See the recommendations section below.    Education: Plan of Care and role of SLP in care and anatomy and physiology of swallow mechanism as it relates to MBSS findings and recommendations were discussed with the patient. Patient expressed understanding. All questions were answered.     Assessment     Isaías Nelson is a 33 y.o. male referred for Modified Barium Swallow Study with a medical diagnosis of R63.30 (ICD-10-CM) - Feeding difficulties. The patient presents with normal oropharyngeal phases of the swallow as determined by the Dysphagia Outcome and Severity Scale (BANDAR). Level 7: Normal in all situations.    Modified  Barium Swallow Study (MBSS) revealed oral phase characterized by adequate lingual and labial strength and range of motion for tongue control, bolus preparation and transport. Lip closure was adequate with no labial escape. Bolus prep and mastication was prolonged with complete recollection. Lingual motion was brisk for adequate bolus transport. There was no significant oral residue. The swallow was initiated when the head of the bolus passed the ramus of the mandible.    Pharyngeal phase characterized by timely initiation of swallow across consistencies. The soft palate elevated for complete closure of the velopharyngeal port. During pharyngeal swallow, adequate base of tongue retraction, anterior hyoid excursion, laryngeal elevation, and pharyngeal stripping wave resulted in complete epiglottic inversion and UES opening. No penetration, aspiration, or significant pharyngeal residue was observed in today's study.     On esophageal screen, dysmotility, delayed esophageal emptying, aerophagia, and retrograde flow from distal to cervical esophagus were noted. Further esophageal imaging including manometry study and/or barium esophagram as well as follow-up with GI is recommended.      Impressions: Patient presents with WFL oral and pharyngeal phases of the swallow with reported symptoms likely related to esophageal findings observed in today's study. In consideration of the Dynamic Imaging Grade of Swallowing Toxicity (DIGEST) (Jeanmarie et al, 2017), patient presents with preserved safety of swallow and preserved efficiency of swallow. Patient appears to be at low risk for aspiration related PNA in consideration of three pillars of aspiration pneumonia (Melodie, 2005) including oral health status, overall health/immune status, and laryngeal vestibule closure/severity of dysphagia. However, unable to assess risk related to aspiration pneumonia cause by the aspiration of gastric content. Patient appears to be a poor  candidate for behavioral swallow rehabilitation.       References:  DAVID Sewell (2005, March). Pneumonia: Factors Beyond Aspiration. Perspectives in Swallowing and Swallowing Disorders (Dysphagia), 14, 10-16.  Rolando KA, Jeniffer MP, Tamanna DA, Bartolome JOSHIK, Cris HY, Christine RS, Guadalupe CD, Josesito SY, Johnny CP, Alvarez J, Lazarus CL, May A, Víctor J, Akbar JW, Rosa HM, Lv JS. Dynamic Imaging Grade of Swallowing Toxicity (DIGEST): Scale development and validation. Cancer. 2017 Jan 1;123(1):62-70. doi: 10.1002/cncr.97099. Epub 2016 Aug 26. PMID: 43407785; PMCID: [id number].    Recommendations:     Consistency Recommendations: thin liquids (IDDSI 0) and regular consistencies (IDDSI 7).  Medications should be taken Whole in thin liquids or whole in puree.   Risk Management/Swallow Guidelines: use good oral hygiene, sit upright for all PO intake, increase physical mobility as tolerated, behavioral reflux precautions, and alternate bites and sips  Specialist Referrals: GI  Ancillary Tests: Consider barium esophagram/manometry study  Therapy: Dysphagia therapy is not recommended at this time.  Follow-up exam: Follow up swallow study is not indicated at this time.    Please contact [facility] at [phone] if there are questions re: the above or if we can be of additional service to this patient.    Savannah Whitley, BA  Student Speech Language Pathologist    Diana Burton MA, CCC-SLP  Speech Language Pathologist  2/27/2023

## 2023-02-27 NOTE — PROGRESS NOTES
See Modified Barium Swallow Study (MBSS) in plan of care.     Diana Burton MA, CCC-SLP  Speech Language Pathologist  2/27/2023

## 2023-02-28 ENCOUNTER — PATIENT MESSAGE (OUTPATIENT)
Dept: REHABILITATION | Facility: HOSPITAL | Age: 33
End: 2023-02-28

## 2023-02-28 ENCOUNTER — CLINICAL SUPPORT (OUTPATIENT)
Dept: REHABILITATION | Facility: HOSPITAL | Age: 33
End: 2023-02-28
Payer: MEDICARE

## 2023-02-28 DIAGNOSIS — R47.1 DYSARTHRIA: ICD-10-CM

## 2023-02-28 DIAGNOSIS — F80.0 SPEECH SOUND DISORDER: Primary | ICD-10-CM

## 2023-02-28 PROCEDURE — 92507 TX SP LANG VOICE COMM INDIV: CPT | Performed by: SPEECH-LANGUAGE PATHOLOGIST

## 2023-02-28 NOTE — PROGRESS NOTES
[facility]  Speech Therapy Progress Note- Dysphagia and Speech  Date: 2/28/2023     Name: Isaías Nelson   MRN: [id number]   Therapy Diagnosis:   Encounter Diagnoses   Name Primary?    Speech sound disorder Yes    Dysarthria    Physician: Latrell Webb MD  Physician Orders: Ambulatory referral/consult to speech therapy   Medical Diagnosis: Pharyngoesophageal dysphagia [R13.14], Oral motor dysfunction [R13.11]    Visit #/ Visits Authorized: 7/ 25 (+eval)  Date of Evaluation:  1/3/23  Insurance Authorization Period: 1/3/2023 - 1/3/2024  Plan of Care Expiration Date:    3/14/23  Extended Plan of Care:  -   Progress Note: 2/3/23   Visits Cancelled: 0  Visits No Show: 0    Time In:  3:27 PM  Time Out:  4:14 PM  Total Billable Time: 47 mins     Precautions: Standard, Communication, and Reduced health literacy   Subjective:   Patient reports: he presents on time with his caregiver and mother. He states he is doing well and played games at camp today.    He was compliant to home exercise program.     Response to previous treatment: use of dysarthria strategies for personally relevant words     Pain Scale:  0/10 on a Visual Analog Scale currently.   Pain Location: NA  Objective:   TIMED  Procedure Min.   Cognitive Therapeutic Interventions, first 15 minutes CPT 67518  0   Cognitive Therapeutic Interventions, each additional 15 minutes CPT 22302  0         UNTIMED  Procedure Min.   Speech- Language- Voice Therapy  47   Dysphagia Therapy  0   Total Timed Units: 0  Total Untimed Units: 1  Charges Billed/Number of units: 83535/1    Short Term Goals: (5 weeks) Current Progress:   Patient will complete diaphragmatic breathing exercises by producing maximal exhalation via diaphragmic breathing with a duration of 5 seconds in 10 trials, consistently with minimal tactile and verbal clinician cues.     Progressing/ Not Met 2/28/2023   Not formally addressed        In order to promote generalization of  "intelligibility, patient will produce sentences during diaphragmatic breathing while incorporating intelligibility strategies (overarticulation, increased loudness) at 80% accuracy with moderate verbal and visual cues.      Progressing/ Not Met 2/28/2023   Patient completed (with visual, verbal, tactile and motor cues) coordination of diaphragmatic breath for phonation with significantly improved loudness and therefore overall intelligibility. He required maximum cueing for coordination of respiration and phonation with ~60% accuracy in today's session for simple words and phrases such as his "I'm Ari", "hey", "I love you", "Hi Thania", "Bozena", "nice to meet you", "how are you" and words targeting production of /d/. Will continue to address and patient and his family encouraged to practice daily at home. Significant improvement in loudness and intelligibility given coordination of breathing for speech. Patient increased loudness significantly today when outside of therapy room during social interaction.      Patient will complete x3-5 rounds of PhoRTE exercises within a session with min cues to increase breath support and loudness.      Progressing/ Not Met 2/28/2023   Not formally addressed      Patient will rate his speech while reading sentences as at least a 2 on a 3 point scale (1=same as before beginning therapy, 2=better but not best, 3=best possible outcome) on 9 /10 trials given auditory feedback.      Progressing/ Not Met 2/28/2023   Not formally addressed      In order to increase awareness of dysarthria, patient will self-monitor phrases and sentences produced by patient and clinician for varied intonation with minimal cues.     Progressing/ Not Met 2/28/2023   Not formally addressed      In order to increase awareness of dysarthria, patient will self-monitor phrases and sentences for intelligibility using minimal verbal cues.      Progressing/ Not Met 2/28/2023   Not formally addressed      Patient will " implement increased intonation, overarticulation, and reduced rate strategies to increase overall intelligibility for functional communication with 80% accuracy and minimal verbal cues for use of strategies.      Progressing/ Not Met 2/28/2023   Not formally addressed      Patient and/or family will be educated on nature of dysarthria, and rationale for completion of exercises/use of strategies with returned demonstration of information.     Goal Met 1/10/2023   Patient's mother educated extensively regarding nature of dysarthria and domains involved in speech mechanism (respiration, phonation, resonance, articulation, prosody) as well as impact of each on intelligibility in patient's speech. She expressed understanding of information provided as evidenced by asking good questions.    Patient will produce /g/ at the word level in all positions of words with 90% accuracy and minimal cueing over 3 sessions.     Progressing/ Not Met 2/28/2023   Not formally addressed     Patient will produce /k/ at the word level in all positions of words with 90% accuracy and minimal cueing over 3 sessions.    /k/ initial: 100% accuracy, independently met x1  /k/ medial: 88% accuracy, minimal cueing   /k/ final: 70% accuracy, minimal cueing     Progressing/ Not Met 2/28/2023    /k/ initial: Met x2 Not formally addressed   Patient will produce /t/ at the word level in all positions of words with 90% accuracy and minimal cueing over 3 sessions.      /t/ initial: 94% accuracy, independently met x1  /t/ medial: 86% accuracy, minimal cueing   /t/ final: 62% accuracy, mod verbal cueing for final consonant deletion     Progressing/ Not Met 2/28/2023    /t/ initial: Met x1  Not formally addressed        Patient will produce /d/ at the word level in all positions of words with 90% accuracy and minimal cueing over 3 sessions.     /d/ initial: 100% accuracy, mod verbal cueing met x2  /d/ medial: 75% accuracy, mod verbal cueing    /d/ final: 57%  accuracy, final consonant devoicing but stimulable 100% of time given cues     Progressing/ Not Met 2/28/2023   /d/ initial: 100% accuracy, minimal verbal cueing  /d/ medial: 73% accuracy, mod verbal cueing   /d/ final: 57% accuracy, mod verbal cueing      Patient will produce /f/ at the word level in all positions of words with 90% accuracy and minimal cueing over 3 sessions.     Progressing/ Not Met 2/28/2023   Not formally addressed   Patient will produce /v/ at the word level in all positions of words with 90% accuracy and minimal cueing over 3 sessions.     Progressing/ Not Met 2/28/2023   Not formally addressed   Patient will produce voiced /th/ at the word level in all positions of words with 90% accuracy and minimal cueing over 3 sessions.     Progressing/ Not Met 2/28/2023   Not formally addressed   Patient will produce voiceless /th/ at the word level in all positions of words with 90% accuracy and minimal cueing over 3 sessions.    Progressing/ Not Met 2/28/2023   Not formally addressed   Patient will produce /z/ at the word level in all positions of words with 90% accuracy and minimal cueing over 3 sessions.    Progressing/ Not Met 2/28/2023   Not formally addressed   Patient will produce /r/ blends at the word level in all positions of words with 90% accuracy and minimal cueing over 3 sessions.    Progressing/ Not Met 2/28/2023   Not formally addressed   Patient will produce /l/ blends at the word level in all positions of words with 90% accuracy and minimal cueing over 3 sessions.    Progressing/ Not Met 2/28/2023   Not formally addressed       Patient Education/Response:   Patient's mother educated extensively regarding nature of dysarthria and domains involved in speech mechanism (respiration, phonation, resonance, articulation, prosody) as well as impact of each on intelligibility in patient's speech. She expressed understanding of information provided as evidenced by asking good questions.     Home  program established: yes-diaphragmatic breathing and production of simple, personally relevant words for coordination of respiration and phonation  Exercises were reviewed and JEANNETTE was able to demonstrate them prior to the end of the session.  JEANNETTE demonstrated fair  understanding of the education provided.     See Electronic Medical Record under Patient Instructions for exercises provided throughout therapy.  Assessment:   JEANNETTE is progressing well towards his goals. Improvement over course of session with gains in intelligibility given use of breath support to coordinate for speech today. Patient demonstrated significantly increased loudness during social interactions outside of the therapy room. Demonstrated progress in production of /d/ in initial and medial positions. Patients mother educated extensively on results of MBSS during today's session. Current goals remain appropriate. Goals to be updated as necessary.     Patient prognosis is Good. Patient will continue to benefit from skilled outpatient speech and language therapy to address the deficits listed in the problem list on initial evaluation, provide patient/family education and to maximize patient's level of independence in the home and community environment.     Medical necessity is demonstrated by the following IMPAIRMENTS:  Dysarthria and articulation deficits impact patient's ability to communicate effectively in all medical, social, home environments safely and efficiently.     Barriers to Therapy: NA  Patient's spiritual, cultural and educational needs considered and patient agreeable to plan of care and goals.  Plan:   Continue Plan of Care with focus on diaphragmatic breathing, coordination of breath support for phonation and speech and articulation goals targeting /t/, /d/, /k/, and /g/.    Savannah Whitley BA  Student Speech Language Pathologist    Diana Burton MA, CCC-SLP  Speech Language Pathologist  2/28/2023

## 2023-03-07 ENCOUNTER — CLINICAL SUPPORT (OUTPATIENT)
Dept: REHABILITATION | Facility: HOSPITAL | Age: 33
End: 2023-03-07
Payer: MEDICARE

## 2023-03-07 DIAGNOSIS — F80.0 SPEECH SOUND DISORDER: Primary | ICD-10-CM

## 2023-03-07 DIAGNOSIS — R47.1 DYSARTHRIA: ICD-10-CM

## 2023-03-07 PROCEDURE — 92507 TX SP LANG VOICE COMM INDIV: CPT | Performed by: SPEECH-LANGUAGE PATHOLOGIST

## 2023-03-07 NOTE — PROGRESS NOTES
[facility]  Speech Therapy Progress Note- Dysphagia and Speech  Date: 3/7/2023     Name: Isaías Nelson   MRN: [id number]   Therapy Diagnosis:   Encounter Diagnoses   Name Primary?    Speech sound disorder Yes    Dysarthria    Physician: Latrell Webb MD  Physician Orders: Ambulatory referral/consult to speech therapy   Medical Diagnosis: Pharyngoesophageal dysphagia [R13.14], Oral motor dysfunction [R13.11]    Visit #/ Visits Authorized: 8/ 25 (+eval)  Date of Evaluation:  1/3/23  Insurance Authorization Period: 1/3/2023 - 1/3/2024  Plan of Care Expiration Date:    3/14/23  Extended Plan of Care:  -   Progress Note: 3/3/23   Visits Cancelled: 0  Visits No Show: 0    Time In:  3:30 PM  Time Out:  4:17 PM  Total Billable Time: 47 mins     Precautions: Standard, Communication, and Reduced health literacy   Subjective:   Patient reports: he presents on time with his caregiver today. Patient states he is doing well and built a bird feeder at camp today.   He was compliant to home exercise program.     Response to previous treatment: use of dysarthria strategies for personally relevant words     Pain Scale:  0/10 on a Visual Analog Scale currently.   Pain Location: NA  Objective:   TIMED  Procedure Min.   Cognitive Therapeutic Interventions, first 15 minutes CPT 98763  0   Cognitive Therapeutic Interventions, each additional 15 minutes CPT 94304  0         UNTIMED  Procedure Min.   Speech- Language- Voice Therapy  47   Dysphagia Therapy  0   Total Timed Units: 0  Total Untimed Units: 1  Charges Billed/Number of units: 13433/1    Short Term Goals: (5 weeks) Current Progress:   Patient will complete diaphragmatic breathing exercises by producing maximal exhalation via diaphragmic breathing with a duration of 5 seconds in 10 trials, consistently with minimal tactile and verbal clinician cues.     Progressing/ Not Met 3/7/2023   Not formally addressed        In order to promote generalization of  "intelligibility, patient will produce sentences during diaphragmatic breathing while incorporating intelligibility strategies (overarticulation, increased loudness) at 80% accuracy with moderate verbal and visual cues.      Progressing/ Not Met 3/7/2023   Patient completed (with visual, verbal, tactile and motor cues) coordination of diaphragmatic breath for phonation with significantly improved loudness and therefore overall intelligibility. He required maximum cueing for coordination of respiration and phonation with ~60% accuracy in today's session for simple words and phrases such as his "I'm Ari", "hey", "I love you", "Hi Thania", "Bozena", "Hi Diana", "I love Sumeet", "nice to meet you", "how are you", etc. Will continue to address and patient and his family encouraged to practice daily at home. Significant improvement in loudness and intelligibility given coordination of breathing for speech. Significantly increased loudness noted today inside therapy room, however, patient loudness decreased significantly when session was moved to noisier environment (therapy gym and outside). Patient demonstrated difficulty with coordination of respiration and phonation when speaking to individuals inside therapy gym, which may also have been secondary to unfamiliar communication partners.      Patient will complete x3-5 rounds of PhoRTE exercises within a session with min cues to increase breath support and loudness.      Progressing/ Not Met 3/7/2023   Not formally addressed      Patient will rate his speech while reading sentences as at least a 2 on a 3 point scale (1=same as before beginning therapy, 2=better but not best, 3=best possible outcome) on 9 /10 trials given auditory feedback.      Progressing/ Not Met 3/7/2023   Not formally addressed      In order to increase awareness of dysarthria, patient will self-monitor phrases and sentences produced by patient and clinician for varied intonation with minimal cues. "     Progressing/ Not Met 3/7/2023   Not formally addressed      In order to increase awareness of dysarthria, patient will self-monitor phrases and sentences for intelligibility using minimal verbal cues.      Progressing/ Not Met 3/7/2023   Not formally addressed      Patient will implement increased intonation, overarticulation, and reduced rate strategies to increase overall intelligibility for functional communication with 80% accuracy and minimal verbal cues for use of strategies.      Progressing/ Not Met 3/7/2023   Not formally addressed      Patient and/or family will be educated on nature of dysarthria, and rationale for completion of exercises/use of strategies with returned demonstration of information.     Goal Met 1/10/2023   Patient's mother educated extensively regarding nature of dysarthria and domains involved in speech mechanism (respiration, phonation, resonance, articulation, prosody) as well as impact of each on intelligibility in patient's speech. She expressed understanding of information provided as evidenced by asking good questions.    Patient will produce /g/ at the word level in all positions of words with 90% accuracy and minimal cueing over 3 sessions.     Progressing/ Not Met 3/7/2023   Not formally addressed     Patient will produce /k/ at the word level in all positions of words with 90% accuracy and minimal cueing over 3 sessions.    /k/ initial: 100% accuracy, independently met x1  /k/ medial: 88% accuracy, minimal cueing   /k/ final: 70% accuracy, minimal cueing     Progressing/ Not Met 3/7/2023    /k/ initial: Met x2 Not formally addressed   Patient will produce /t/ at the word level in all positions of words with 90% accuracy and minimal cueing over 3 sessions.      /t/ initial: 94% accuracy, independently met x1  /t/ medial: 86% accuracy, minimal cueing   /t/ final: 62% accuracy, mod verbal cueing for final consonant deletion     Progressing/ Not Met 3/7/2023    /t/ initial:  Met x1  Not formally addressed        Patient will produce /d/ at the word level in all positions of words with 90% accuracy and minimal cueing over 3 sessions.     /d/ initial: 100% accuracy, mod verbal cueing met x2  /d/ medial: 75% accuracy, mod verbal cueing    /d/ final: 57% accuracy, final consonant devoicing but stimulable 100% of time given cues     Progressing/ Not Met 3/7/2023   Not formally addressed.    Patient will produce /f/ at the word level in all positions of words with 90% accuracy and minimal cueing over 3 sessions.     Progressing/ Not Met 3/7/2023   Not formally addressed   Patient will produce /v/ at the word level in all positions of words with 90% accuracy and minimal cueing over 3 sessions.     Progressing/ Not Met 3/7/2023   Not formally addressed   Patient will produce voiced /th/ at the word level in all positions of words with 90% accuracy and minimal cueing over 3 sessions.     Progressing/ Not Met 3/7/2023   Not formally addressed   Patient will produce voiceless /th/ at the word level in all positions of words with 90% accuracy and minimal cueing over 3 sessions.    Progressing/ Not Met 3/7/2023   Not formally addressed   Patient will produce /z/ at the word level in all positions of words with 90% accuracy and minimal cueing over 3 sessions.    Progressing/ Not Met 3/7/2023   Not formally addressed   Patient will produce /r/ blends at the word level in all positions of words with 90% accuracy and minimal cueing over 3 sessions.    Progressing/ Not Met 3/7/2023   Not formally addressed   Patient will produce /l/ blends at the word level in all positions of words with 90% accuracy and minimal cueing over 3 sessions.    Progressing/ Not Met 3/7/2023   Not formally addressed       Patient Education/Response:   Patient's mother educated extensively regarding nature of dysarthria and domains involved in speech mechanism (respiration, phonation, resonance, articulation, prosody) as well as  impact of each on intelligibility in patient's speech. She expressed understanding of information provided as evidenced by asking good questions.     Home program established: yes-diaphragmatic breathing and production of simple, personally relevant words for coordination of respiration and phonation  Exercises were reviewed and JEANNETTE was able to demonstrate them prior to the end of the session.  JEANNETTE demonstrated fair  understanding of the education provided.     See Electronic Medical Record under Patient Instructions for exercises provided throughout therapy.  Assessment:   JEANNETTE is progressing well towards his goals. Increased progress made toward coordination of respiration and phonation for appropriate loudness and intelligibility. Patient significantly improved loudness within therapy room, however, demonstrated difficulty when session was moved to a noisier environment (therapy gym and outside). Overall progress inconsistent, but improved compared to previous. Current goals remain appropriate. Goals to be updated as necessary.     Patient prognosis is Good. Patient will continue to benefit from skilled outpatient speech and language therapy to address the deficits listed in the problem list on initial evaluation, provide patient/family education and to maximize patient's level of independence in the home and community environment.     Medical necessity is demonstrated by the following IMPAIRMENTS:  Dysarthria and articulation deficits impact patient's ability to communicate effectively in all medical, social, home environments safely and efficiently.     Barriers to Therapy: NA  Patient's spiritual, cultural and educational needs considered and patient agreeable to plan of care and goals.  Plan:   Continue Plan of Care with focus on diaphragmatic breathing, coordination of breath support for phonation and speech and articulation goals targeting /t/, /d/, /k/, and /g/.    Savannah Whitley,  BA  Student Speech Language Pathologist    Diana Burton MA, CCC-SLP  Speech Language Pathologist  3/7/2023

## 2023-03-14 ENCOUNTER — CLINICAL SUPPORT (OUTPATIENT)
Dept: REHABILITATION | Facility: HOSPITAL | Age: 33
End: 2023-03-14
Payer: MEDICARE

## 2023-03-14 DIAGNOSIS — F80.0 SPEECH SOUND DISORDER: Primary | ICD-10-CM

## 2023-03-14 DIAGNOSIS — R47.1 DYSARTHRIA: ICD-10-CM

## 2023-03-14 PROCEDURE — 92507 TX SP LANG VOICE COMM INDIV: CPT | Performed by: SPEECH-LANGUAGE PATHOLOGIST

## 2023-03-14 NOTE — PLAN OF CARE
[facility]  Speech Therapy Updated Plan of Care- Speech/Voice         Date: 3/14/2023   Name: Isaías Nelson  Clinic Number: [id number]    Therapy Diagnosis:   Encounter Diagnoses   Name Primary?    Speech sound disorder Yes    Dysarthria    Physician: Latrell Webb MD    Physician Orders: Ambulatory referral/consult to speech therapy   Medical Diagnosis: Pharyngoesophageal dysphagia [R13.14], Oral motor dysfunction [R13.11]    Visit #/ Visits Authorized:  9 /25   Evaluation Date: 1/3/23  Insurance Authorization Period: 1/3/2023 - 1/3/2024  Plan of Care Expiration:    3/14/23  New POC Certification Period:  3/14/23-5/23/23    Total Visits Received: 11    Precautions:Standard, Communication, and Reduced health literacy   Subjective     Update: Since evaluation, patient has progressed toward articulation and dysarthria goals with progress reported by patient's family and caregivers at home in overall communication. He continues to demonstrate reduced intelligibility for longer conversation as well as informal conversation. Additionally, MBSS was completed on 2/27/23 with the following impressions and recommendations and upcoming GI appt scheduled for April 2023:     Isaías Nelson is a 33 y.o. male referred for Modified Barium Swallow Study with a medical diagnosis of R63.30 (ICD-10-CM) - Feeding difficulties. The patient presents with normal oropharyngeal phases of the swallow as determined by the Dysphagia Outcome and Severity Scale (BANDAR). Level 7: Normal in all situations.     Modified Barium Swallow Study (MBSS) revealed oral phase characterized by adequate lingual and labial strength and range of motion for tongue control, bolus preparation and transport. Lip closure was adequate with no labial escape. Bolus prep and mastication was prolonged with complete recollection. Lingual motion was brisk for adequate bolus transport. There was no significant oral residue. The swallow was initiated  when the head of the bolus passed the ramus of the mandible. Pharyngeal phase characterized by timely initiation of swallow across consistencies. The soft palate elevated for complete closure of the velopharyngeal port. During pharyngeal swallow, adequate base of tongue retraction, anterior hyoid excursion, laryngeal elevation, and pharyngeal stripping wave resulted in complete epiglottic inversion and UES opening. No penetration, aspiration, or significant pharyngeal residue was observed in today's study. On esophageal screen, dysmotility, delayed esophageal emptying, aerophagia, and retrograde flow from distal to cervical esophagus were noted. Further esophageal imaging including manometry study and/or barium esophagram as well as follow-up with GI is recommended.     Impressions: Patient presents with WFL oral and pharyngeal phases of the swallow with reported symptoms likely related to esophageal findings observed in today's study. In consideration of the Dynamic Imaging Grade of Swallowing Toxicity (DIGEST) (Jeanmarie et al, 2017), patient presents with preserved safety of swallow and preserved efficiency of swallow. Patient appears to be at low risk for aspiration related PNA in consideration of three pillars of aspiration pneumonia (Melodie, 2005) including oral health status, overall health/immune status, and laryngeal vestibule closure/severity of dysphagia. However, unable to assess risk related to aspiration pneumonia cause by the aspiration of gastric content. Patient appears to be a poor candidate for behavioral swallow rehabilitation.         References:  DAVID Sewell (2005, March). Pneumonia: Factors Beyond Aspiration. Perspectives in Swallowing and Swallowing Disorders (Dysphagia), 14, 10-16.  Rolando KA, Jeniffer MP, Tamanna DA, Bartolome JOSHIK, Cris HY, Christine RS, Guadalupe CD, Josesito SY, Johnny CP, Alvarez J, Lazarus CL, May A, Víctor J, Akbar JW, Rosa HM, Lv JS. Dynamic Imaging Grade of Swallowing  Toxicity (DIGEST): Scale development and validation. Cancer. 2017 Jan 1;123(1):62-70. doi: 10.1002/cncr.12302. Epub 2016 Aug 26. PMID: [id number]; PMCID: [id number].      Objective     Update: see follow up note dated 3/14/2023    Assessment     Update: Isaías Nelson presents to [facility] status post medical diagnosis of Pharyngoesophageal dysphagia [R13.14], Oral motor dysfunction [R13.11]. He presents with mild-moderate undifferentiated (unclear secondary to lack of lesion site) dysarthria characterized by slightly reduced lingual strength and overall hypotonia consistent with DS with reduced breath support resulting in reduced and monoloudness, monopitch, imprecise consonants, slow rate and short phrases all of which are impacting intelligibility. Additionally, he presents with severe speech sound disorder characterized by distortion/substitution/reduction of clusters and consonant blends, substitution/devoicing of /s/ for /z/ and final consonant deletion. Demonstrates impairments including limitations as described in the problem list. Positive prognostic factors include family support and patient motivation. Negative prognostic factors include chronic nature of condition. No barriers to therapy identified.. Patient will benefit from skilled, outpatient rehabilitation speech therapy.    Rehab Potential: fair   Pt's spiritual, cultural, and educational needs considered and patient agreeable to plan of care and goals.    Education: Plan of Care and role of SLP in care     Previous Short Term Goals Status: 5 weeks  Short Term Goals: (5 weeks) Current Progress:   Patient will complete diaphragmatic breathing exercises by producing maximal exhalation via diaphragmic breathing with a duration of 5 seconds in 10 trials, consistently with minimal tactile and verbal clinician cues.      Progressing/ Not Met 3/7/2023   Not formally addressed          In order to promote generalization of  "intelligibility, patient will produce sentences during diaphragmatic breathing while incorporating intelligibility strategies (overarticulation, increased loudness) at 80% accuracy with moderate verbal and visual cues.       Progressing/ Not Met 3/7/2023   Patient completed (with visual, verbal, tactile and motor cues) coordination of diaphragmatic breath for phonation with significantly improved loudness and therefore overall intelligibility. He required maximum cueing for coordination of respiration and phonation with ~60% accuracy in today's session for simple words and phrases such as his "I'm Ari", "hey", "I love you", "Hi Thania", "Bozena", "Hi Diana", "I love Sumeet", "nice to meet you", "how are you", etc. Will continue to address and patient and his family encouraged to practice daily at home. Significant improvement in loudness and intelligibility given coordination of breathing for speech. Significantly increased loudness noted today inside therapy room, however, patient loudness decreased significantly when session was moved to noisier environment (therapy gym and outside). Patient demonstrated difficulty with coordination of respiration and phonation when speaking to individuals inside therapy gym, which may also have been secondary to unfamiliar communication partners.       Patient will complete x3-5 rounds of PhoRTE exercises within a session with min cues to increase breath support and loudness.       Progressing/ Not Met 3/7/2023   Not formally addressed      Patient will rate his speech while reading sentences as at least a 2 on a 3 point scale (1=same as before beginning therapy, 2=better but not best, 3=best possible outcome) on 9 /10 trials given auditory feedback.       Progressing/ Not Met 3/7/2023   Not formally addressed      In order to increase awareness of dysarthria, patient will self-monitor phrases and sentences produced by patient and clinician for varied intonation with minimal cues. "      Progressing/ Not Met 3/7/2023   Not formally addressed      In order to increase awareness of dysarthria, patient will self-monitor phrases and sentences for intelligibility using minimal verbal cues.       Progressing/ Not Met 3/7/2023   Not formally addressed      Patient will implement increased intonation, overarticulation, and reduced rate strategies to increase overall intelligibility for functional communication with 80% accuracy and minimal verbal cues for use of strategies.       Progressing/ Not Met 3/7/2023   Not formally addressed      Patient and/or family will be educated on nature of dysarthria, and rationale for completion of exercises/use of strategies with returned demonstration of information.      Goal Met 1/10/2023   Patient's mother educated extensively regarding nature of dysarthria and domains involved in speech mechanism (respiration, phonation, resonance, articulation, prosody) as well as impact of each on intelligibility in patient's speech. She expressed understanding of information provided as evidenced by asking good questions.    Patient will produce /g/ at the word level in all positions of words with 90% accuracy and minimal cueing over 3 sessions.      Progressing/ Not Met 3/7/2023   Not formally addressed     Patient will produce /k/ at the word level in all positions of words with 90% accuracy and minimal cueing over 3 sessions.     /k/ initial: 100% accuracy, independently met x1  /k/ medial: 88% accuracy, minimal cueing   /k/ final: 70% accuracy, minimal cueing      Progressing/ Not Met 3/7/2023    /k/ initial: Met x2 Not formally addressed   Patient will produce /t/ at the word level in all positions of words with 90% accuracy and minimal cueing over 3 sessions.       /t/ initial: 94% accuracy, independently met x1  /t/ medial: 86% accuracy, minimal cueing   /t/ final: 62% accuracy, mod verbal cueing for final consonant deletion      Progressing/ Not Met 3/7/2023    /t/  initial: Met x1  Not formally addressed          Patient will produce /d/ at the word level in all positions of words with 90% accuracy and minimal cueing over 3 sessions.      /d/ initial: 100% accuracy, mod verbal cueing met x2  /d/ medial: 75% accuracy, mod verbal cueing    /d/ final: 57% accuracy, final consonant devoicing but stimulable 100% of time given cues      Progressing/ Not Met 3/7/2023   Not formally addressed.    Patient will produce /f/ at the word level in all positions of words with 90% accuracy and minimal cueing over 3 sessions.      Progressing/ Not Met 3/7/2023   Not formally addressed   Patient will produce /v/ at the word level in all positions of words with 90% accuracy and minimal cueing over 3 sessions.      Progressing/ Not Met 3/7/2023   Not formally addressed   Patient will produce voiced /th/ at the word level in all positions of words with 90% accuracy and minimal cueing over 3 sessions.      Progressing/ Not Met 3/7/2023   Not formally addressed   Patient will produce voiceless /th/ at the word level in all positions of words with 90% accuracy and minimal cueing over 3 sessions.     Progressing/ Not Met 3/7/2023   Not formally addressed   Patient will produce /z/ at the word level in all positions of words with 90% accuracy and minimal cueing over 3 sessions.     Progressing/ Not Met 3/7/2023   Not formally addressed   Patient will produce /r/ blends at the word level in all positions of words with 90% accuracy and minimal cueing over 3 sessions.     Progressing/ Not Met 3/7/2023   Not formally addressed   Patient will produce /l/ blends at the word level in all positions of words with 90% accuracy and minimal cueing over 3 sessions.     Progressing/ Not Met 3/7/2023   Not formally addressed        New Short Term Goals:   See previously unmet goals above.      Long Term Goal Status:  10 weeks  Patient will develop functional and intelligible speech and utilize compensatory strategies  through the use of adequate labial and lingual function, increased articulatory precision and speech prosody.    Goals Previously Met:  NA     Reasons for Recertification of Therapy: Patient continues to progress toward goals, specifically articulation and intelligibility/clear speech strategies with great family support, participation in sessions, and motivation toward addressing goals. He will continue to benefit from outpatient speech therapy to address previously unmet goals.     Plan     Updated Certification Period: 3/14/2023 to 5/23/23    Recommended Treatment Plan: Patient will participate in the [facility] rehabilitation program for speech therapy 1 times per week to address his Communication deficits, to educate patient and their family, and to participate in a home exercise program.     Other recommendations: NA     Therapist's Name:  Diana Burton MA, CCC-SLP  Speech Language Pathologist  3/14/2023      I CERTIFY THE NEED FOR THESE SERVICES FURNISHED UNDER THIS PLAN OF TREATMENT AND WHILE UNDER MY CARE      Physician Name: _______________________________    Physician Signature: ____________________________

## 2023-03-14 NOTE — PROGRESS NOTES
[facility]  Speech Therapy Progress Note- Dysphagia and Speech  Date: 3/14/2023     Name: Isaías Nelson   MRN: [id number]   Therapy Diagnosis:   Encounter Diagnoses   Name Primary?    Speech sound disorder Yes    Dysarthria    Physician: Latrell Webb MD  Physician Orders: Ambulatory referral/consult to speech therapy   Medical Diagnosis: Pharyngoesophageal dysphagia [R13.14], Oral motor dysfunction [R13.11]    Visit #/ Visits Authorized: 9/ 25 (+eval)  Date of Evaluation:  1/3/23  Insurance Authorization Period: 1/3/2023 - 1/3/2024  Plan of Care Expiration Date:   3/14/23  Extended Plan of Care:  3/14/23-5/23/23  Progress Note: 4/3/23   Visits Cancelled: 0  Visits No Show: 0    Time In:  3:30 PM  Time Out:  4:15 PM  Total Billable Time: 45 mins     Precautions: Standard, Communication, and Reduced health literacy   Subjective:   Patient reports: he presents on time with his caregiver and mother today. Patient states he is doing well and colored a picture today. He is excited and getting ready for his trip to [place].   He was compliant to home exercise program.     Response to previous treatment: use of dysarthria strategies for personally relevant words     Pain Scale:  0/10 on a Visual Analog Scale currently.   Pain Location: NA  Objective:   TIMED  Procedure Min.   Cognitive Therapeutic Interventions, first 15 minutes CPT 78353  0   Cognitive Therapeutic Interventions, each additional 15 minutes CPT 70402  0         UNTIMED  Procedure Min.   Speech- Language- Voice Therapy  45   Dysphagia Therapy  0   Total Timed Units: 0  Total Untimed Units: 1  Charges Billed/Number of units: 49777/1    Short Term Goals: (5 weeks) Current Progress:   Patient will complete diaphragmatic breathing exercises by producing maximal exhalation via diaphragmic breathing with a duration of 5 seconds in 10 trials, consistently with minimal tactile and verbal clinician cues.     Progressing/ Not Met 3/14/2023   " Not formally addressed        In order to promote generalization of intelligibility, patient will produce sentences during diaphragmatic breathing while incorporating intelligibility strategies (overarticulation, increased loudness) at 80% accuracy with moderate verbal and visual cues.      Progressing/ Not Met 3/14/2023   Patient completed (with visual, verbal, tactile and motor cues) coordination of diaphragmatic breath for phonation with significantly improved loudness and therefore overall intelligibility. He required maximum cueing for coordination of respiration and phonation with ~55% accuracy in today's session for simple words, meaningful phrases such as his "I'm Ari", "hey", "I love you", "Hi Thania", "Bozena", "mom", "Hi Diana", etc, and in conversation with ST, mother, and caregiver. Will continue to address and patient and his family encouraged to practice daily at home. Significant improvement in loudness and intelligibility given coordination of breathing for speech. Increased loudness noted today inside therapy room, however, patient loudness decreased significantly when session was moved to noisier environment (therapy gym). Patient demonstrated difficulty with coordination of respiration and phonation when speaking to individuals inside therapy gym, which may also be due to fear of noisy, unfamiliar environment.      Patient will complete x3-5 rounds of PhoRTE exercises within a session with min cues to increase breath support and loudness.      Progressing/ Not Met 3/14/2023   Not formally addressed      Patient will rate his speech while reading sentences as at least a 2 on a 3 point scale (1=same as before beginning therapy, 2=better but not best, 3=best possible outcome) on 9 /10 trials given auditory feedback.      Progressing/ Not Met 3/14/2023   Not formally addressed      In order to increase awareness of dysarthria, patient will self-monitor phrases and sentences produced by patient and " clinician for varied intonation with minimal cues.     Progressing/ Not Met 3/14/2023   Not formally addressed      In order to increase awareness of dysarthria, patient will self-monitor phrases and sentences for intelligibility using minimal verbal cues.      Progressing/ Not Met 3/14/2023   Not formally addressed      Patient will implement increased intonation, overarticulation, and reduced rate strategies to increase overall intelligibility for functional communication with 80% accuracy and minimal verbal cues for use of strategies.      Progressing/ Not Met 3/14/2023   Not formally addressed      Patient will produce /g/ at the word level in all positions of words with 90% accuracy and minimal cueing over 3 sessions.     Progressing/ Not Met 3/14/2023   Not formally addressed     Patient will produce /k/ at the word level in all positions of words with 90% accuracy and minimal cueing over 3 sessions.    /k/ initial: 100% accuracy, independently met x1  /k/ medial: 88% accuracy, minimal cueing   /k/ final: 70% accuracy, minimal cueing     Progressing/ Not Met 3/14/2023    /k/ initial: Met x2 Not formally addressed   Patient will produce /t/ at the word level in all positions of words with 90% accuracy and minimal cueing over 3 sessions.      /t/ initial: 94% accuracy, independently met x1  /t/ medial: 86% accuracy, minimal cueing   /t/ final: 62% accuracy, mod verbal cueing for final consonant deletion     Progressing/ Not Met 3/14/2023    /t/ initial: Met x1  Not formally addressed        Patient will produce /d/ at the word level in all positions of words with 90% accuracy and minimal cueing over 3 sessions.     /d/ initial: 100% accuracy, mod verbal cueing met x2  /d/ medial: 75% accuracy, mod verbal cueing    /d/ final: 57% accuracy, final consonant devoicing but stimulable 100% of time given cues     Progressing/ Not Met 3/14/2023   Not formally addressed.    Patient will produce /f/ at the word level in  all positions of words with 90% accuracy and minimal cueing over 3 sessions.     Progressing/ Not Met 3/14/2023   Not formally addressed   Patient will produce /v/ at the word level in all positions of words with 90% accuracy and minimal cueing over 3 sessions.     Progressing/ Not Met 3/14/2023   Not formally addressed   Patient will produce voiced /th/ at the word level in all positions of words with 90% accuracy and minimal cueing over 3 sessions.     Progressing/ Not Met 3/14/2023   Not formally addressed   Patient will produce voiceless /th/ at the word level in all positions of words with 90% accuracy and minimal cueing over 3 sessions.    Progressing/ Not Met 3/14/2023   Not formally addressed   Patient will produce /z/ at the word level in all positions of words with 90% accuracy and minimal cueing over 3 sessions.    Progressing/ Not Met 3/14/2023   Not formally addressed   Patient will produce /r/ blends at the word level in all positions of words with 90% accuracy and minimal cueing over 3 sessions.    Progressing/ Not Met 3/14/2023   Not formally addressed   Patient will produce /l/ blends at the word level in all positions of words with 90% accuracy and minimal cueing over 3 sessions.    Progressing/ Not Met 3/14/2023   Not formally addressed       Patient Education/Response:   Patient's mother educated extensively regarding nature of dysarthria and domains involved in speech mechanism (respiration, phonation, resonance, articulation, prosody) as well as impact of each on intelligibility in patient's speech. She expressed understanding of information provided as evidenced by asking good questions.     Home program established: yes-diaphragmatic breathing and production of simple, personally relevant words for coordination of respiration and phonation  Exercises were reviewed and JEANNETTE was able to demonstrate them prior to the end of the session.  JEANNETTE demonstrated fair  understanding of the  education provided.     See Electronic Medical Record under Patient Instructions for exercises provided throughout therapy.  Assessment:   JEANNETTE is progressing well towards his goals. Increased progress made toward coordination of respiration and phonation for appropriate loudness and intelligibility. Patient significantly improved loudness within therapy room, however, demonstrated difficulty when session was moved to a noisier environment (therapy gym). Discussed progress with mother and caregiver who expressed desire to continue with therapy. Current goals remain appropriate. Goals to be updated as necessary.     Patient prognosis is Good. Patient will continue to benefit from skilled outpatient speech and language therapy to address the deficits listed in the problem list on initial evaluation, provide patient/family education and to maximize patient's level of independence in the home and community environment.     Medical necessity is demonstrated by the following IMPAIRMENTS:  Dysarthria and articulation deficits impact patient's ability to communicate effectively in all medical, social, home environments safely and efficiently.     Barriers to Therapy: NA  Patient's spiritual, cultural and educational needs considered and patient agreeable to plan of care and goals.  Plan:   Continue Plan of Care with focus on diaphragmatic breathing, coordination of breath support for phonation and speech and articulation goals targeting /t/, /d/, /k/, and /g/.    Savannah Whitley, BA  Student Speech Language Pathologist    Diana Burton MA, CCC-SLP  Speech Language Pathologist  3/14/2023

## 2023-04-11 ENCOUNTER — CLINICAL SUPPORT (OUTPATIENT)
Dept: REHABILITATION | Facility: HOSPITAL | Age: 33
End: 2023-04-11
Payer: MEDICARE

## 2023-04-11 DIAGNOSIS — F80.0 SPEECH SOUND DISORDER: Primary | ICD-10-CM

## 2023-04-11 DIAGNOSIS — R47.1 DYSARTHRIA: ICD-10-CM

## 2023-04-11 PROCEDURE — 92507 TX SP LANG VOICE COMM INDIV: CPT | Performed by: SPEECH-LANGUAGE PATHOLOGIST

## 2023-04-11 NOTE — PROGRESS NOTES
[facility]  Speech Therapy Progress Note- Dysphagia and Speech  Date: 4/11/2023     Name: Isaías Nelson   MRN: [id number]   Therapy Diagnosis:   Encounter Diagnoses   Name Primary?    Speech sound disorder Yes    Dysarthria    Physician: Latrell Webb MD  Physician Orders: Ambulatory referral/consult to speech therapy   Medical Diagnosis: Pharyngoesophageal dysphagia [R13.14], Oral motor dysfunction [R13.11]    Visit #/ Visits Authorized: 10/ 25 (+eval)  Date of Evaluation:  1/3/23  Insurance Authorization Period: 1/3/2023 - 1/3/2024  Plan of Care Expiration Date:   3/14/23  Extended Plan of Care:  3/14/23-5/23/23  Progress Note: 4/3/23   Visits Cancelled: 0  Visits No Show: 0    Time In:  4:19 PM  Time Out:  4:55 PM  Total Billable Time: 36 mins     Precautions: Standard, Communication, and Reduced health literacy   Subjective:   Patient reports: he presents on time with his caregiver today. Patient states he is doing well and enjoyed his vacation. He is very excited for upcoming [place] vacation.   He was compliant to home exercise program.     Response to previous treatment: use of dysarthria strategies for personally relevant words     Pain Scale:  0/10 on a Visual Analog Scale currently.   Pain Location: NA  Objective:   TIMED  Procedure Min.   Cognitive Therapeutic Interventions, first 15 minutes CPT 09749  0   Cognitive Therapeutic Interventions, each additional 15 minutes CPT 40569  0         UNTIMED  Procedure Min.   Speech- Language- Voice Therapy  36   Dysphagia Therapy  0   Total Timed Units: 0  Total Untimed Units: 1  Charges Billed/Number of units: 57498/1    Short Term Goals: (5 weeks) Current Progress:   Patient will complete diaphragmatic breathing exercises by producing maximal exhalation via diaphragmic breathing with a duration of 5 seconds in 10 trials, consistently with minimal tactile and verbal clinician cues.     Progressing/ Not Met 4/11/2023   Not formally addressed  "       In order to promote generalization of intelligibility, patient will produce sentences during diaphragmatic breathing while incorporating intelligibility strategies (overarticulation, increased loudness) at 80% accuracy with moderate verbal and visual cues.      Progressing/ Not Met 4/11/2023   Patient completed (with visual, verbal, tactile and motor cues) coordination of diaphragmatic breath for phonation with significantly improved loudness and therefore overall intelligibility. He required maximum cueing for coordination of respiration and phonation with ~65% accuracy in today's session for single words, meaningful/personally relevant phrases such as his name, and primarily in conversation with ST and caregiver about his recent vacation. Patient with some difficulty in more spontaneous speaking situation in today's session, but overall improved from previous with subjectively louder and more intelligible responses at baseline. Will continue to address and patient and his family encouraged to practice daily at home.      Patient will complete x3-5 rounds of PhoRTE exercises within a session with min cues to increase breath support and loudness.      Progressing/ Not Met 4/11/2023   Patient was stimulable for improved voice using increased projection and modified PhoRTE voice protocol.  Did benefit from complete cueing to improve loudness without changing pitch, for use of diaphragmatic breath for increased loudness and verbal/visual cues for prolonged duration. Patient with improved stimulability using "ROAR" like a tiger to facilitate protocol.       Phonation resistance training exercises (PhoRTE [Fabienne & Kristine, 2013]): to be practiced 2x/day, 7 days/week  - Sustain the vowel /a/ with a loud, energized voice for as long as possible. (x12)  - Glide from modal pitch to high pitch on /a/ using a loud, energized voice. (x3) Not formally addressed    - Glide from modal pitch to low pitch on the vowel /a/ " using a loud, energized voice. (x3) Not formally addressed    - Shout selected functional phrases using a loud and higher-pitched voice, like calling over a fence. (4 phrases, 2x each) Not formally addressed    - Shout selected functional phrases using a strong, authoritative voice at a low pitch level. (4 phrases, 2x each) Not formally addressed         Patient will rate his speech while reading sentences as at least a 2 on a 3 point scale (1=same as before beginning therapy, 2=better but not best, 3=best possible outcome) on 9 /10 trials given auditory feedback.      Progressing/ Not Met 4/11/2023   Not formally addressed      In order to increase awareness of dysarthria, patient will self-monitor phrases and sentences produced by patient and clinician for varied intonation with minimal cues.     Progressing/ Not Met 4/11/2023   Not formally addressed      In order to increase awareness of dysarthria, patient will self-monitor phrases and sentences for intelligibility using minimal verbal cues.      Progressing/ Not Met 4/11/2023   Not formally addressed      Patient will implement increased intonation, overarticulation, and reduced rate strategies to increase overall intelligibility for functional communication with 80% accuracy and minimal verbal cues for use of strategies.      Progressing/ Not Met 4/11/2023   Not formally addressed      Patient will produce /g/ at the word level in all positions of words with 90% accuracy and minimal cueing over 3 sessions.     Progressing/ Not Met 4/11/2023   Not formally addressed     Patient will produce /k/ at the word level in all positions of words with 90% accuracy and minimal cueing over 3 sessions.    /k/ initial: 100% accuracy, independently met x1  /k/ medial: 88% accuracy, minimal cueing   /k/ final: 70% accuracy, minimal cueing     Progressing/ Not Met 4/11/2023    /k/ initial: Met x2 Not formally addressed   Patient will produce /t/ at the word level in all  positions of words with 90% accuracy and minimal cueing over 3 sessions.      /t/ initial: 94% accuracy, independently met x1  /t/ medial: 86% accuracy, minimal cueing   /t/ final: 62% accuracy, mod verbal cueing for final consonant deletion     Progressing/ Not Met 4/11/2023    /t/ initial: Met x1  Not formally addressed        Patient will produce /d/ at the word level in all positions of words with 90% accuracy and minimal cueing over 3 sessions.     /d/ initial: 100% accuracy, mod verbal cueing met x2  /d/ medial: 75% accuracy, mod verbal cueing    /d/ final: 57% accuracy, final consonant devoicing but stimulable 100% of time given cues     Progressing/ Not Met 4/11/2023   Not formally addressed.    Patient will produce /f/ at the word level in all positions of words with 90% accuracy and minimal cueing over 3 sessions.     Progressing/ Not Met 4/11/2023   Not formally addressed   Patient will produce /v/ at the word level in all positions of words with 90% accuracy and minimal cueing over 3 sessions.     Progressing/ Not Met 4/11/2023   Not formally addressed   Patient will produce voiced /th/ at the word level in all positions of words with 90% accuracy and minimal cueing over 3 sessions.     Progressing/ Not Met 4/11/2023   Not formally addressed   Patient will produce voiceless /th/ at the word level in all positions of words with 90% accuracy and minimal cueing over 3 sessions.    Progressing/ Not Met 4/11/2023   Not formally addressed   Patient will produce /z/ at the word level in all positions of words with 90% accuracy and minimal cueing over 3 sessions.    Progressing/ Not Met 4/11/2023   Not formally addressed   Patient will produce /r/ blends at the word level in all positions of words with 90% accuracy and minimal cueing over 3 sessions.    Progressing/ Not Met 4/11/2023   Not formally addressed   Patient will produce /l/ blends at the word level in all positions of words with 90% accuracy and  "minimal cueing over 3 sessions.    Progressing/ Not Met 4/11/2023   Not formally addressed       Patient Education/Response:   Patient's mother educated extensively regarding nature of dysarthria and domains involved in speech mechanism (respiration, phonation, resonance, articulation, prosody) as well as impact of each on intelligibility in patient's speech. She expressed understanding of information provided as evidenced by asking good questions.     Home program established: yes-diaphragmatic breathing and production of simple, personally relevant words for coordination of respiration and phonation  Exercises were reviewed and JEANNETTE was able to demonstrate them prior to the end of the session.  JEANNETTE demonstrated fair  understanding of the education provided.     See Electronic Medical Record under Patient Instructions for exercises provided throughout therapy.  Assessment:   Progress Note:  Overall, progress in recent sessions in patient's use of strategies for increased loudness and therefore overall intelligibility in conversation and for production of personally relevant phrases. Patient stimulable for increased loudness using modified PhoRTE protocol using "ROAR" in today's session. Will continue to address/monitor progress, progressing per patient tolerance.     JEANNETTE is progressing well towards his goals. Current goals remain appropriate. Goals to be updated as necessary.     Patient prognosis is Good. Patient will continue to benefit from skilled outpatient speech and language therapy to address the deficits listed in the problem list on initial evaluation, provide patient/family education and to maximize patient's level of independence in the home and community environment.     Medical necessity is demonstrated by the following IMPAIRMENTS:  Dysarthria and articulation deficits impact patient's ability to communicate effectively in all medical, social, home environments safely and efficiently. "     Barriers to Therapy: NA  Patient's spiritual, cultural and educational needs considered and patient agreeable to plan of care and goals.  Plan:   Continue Plan of Care with focus on diaphragmatic breathing, coordination of breath support for phonation and speech and articulation goals targeting /t/, /d/, /k/, and /g/.    Diana Burton MA, CCC-SLP  Speech Language Pathologist  4/11/2023

## 2023-04-18 ENCOUNTER — CLINICAL SUPPORT (OUTPATIENT)
Dept: REHABILITATION | Facility: HOSPITAL | Age: 33
End: 2023-04-18
Payer: MEDICARE

## 2023-04-18 DIAGNOSIS — F80.0 SPEECH SOUND DISORDER: Primary | ICD-10-CM

## 2023-04-18 DIAGNOSIS — R47.1 DYSARTHRIA: ICD-10-CM

## 2023-04-18 PROCEDURE — 92507 TX SP LANG VOICE COMM INDIV: CPT | Performed by: SPEECH-LANGUAGE PATHOLOGIST

## 2023-04-18 NOTE — PROGRESS NOTES
[facility]  Speech Therapy Progress Note- Dysphagia and Speech  Date: 4/18/2023     Name: Isaías Nelson   MRN: [id number]   Therapy Diagnosis:   No diagnosis found.  Physician: Latrell Webb MD  Physician Orders: Ambulatory referral/consult to speech therapy   Medical Diagnosis: Pharyngoesophageal dysphagia [R13.14], Oral motor dysfunction [R13.11]    Visit #/ Visits Authorized: 11/ 25 (+eval)  Date of Evaluation:  1/3/23  Insurance Authorization Period: 1/3/2023 - 1/3/2024  Plan of Care Expiration Date:   3/14/23  Extended Plan of Care:  3/14/23-5/23/23  Progress Note: 4/3/23   Visits Cancelled: 0  Visits No Show: 0    Time In:  4:16 PM  Time Out:  4:55 PM  Total Billable Time: 39 mins     Precautions: Standard, Communication, and Reduced health literacy   Subjective:   Patient reports: he presents on time with his caregiver today. Patient states he enjoyed his vacation and read a book at camp today.   He was compliant to home exercise program.     Response to previous treatment: use of dysarthria strategies for personally relevant words     Pain Scale:  0/10 on a Visual Analog Scale currently.   Pain Location: NA  Objective:   TIMED  Procedure Min.   Cognitive Therapeutic Interventions, first 15 minutes CPT 63693  0   Cognitive Therapeutic Interventions, each additional 15 minutes CPT 51060  0         UNTIMED  Procedure Min.   Speech- Language- Voice Therapy  39   Dysphagia Therapy  0   Total Timed Units: 0  Total Untimed Units: 1  Charges Billed/Number of units: 30502/1    Short Term Goals: (5 weeks) Current Progress:   Patient will complete diaphragmatic breathing exercises by producing maximal exhalation via diaphragmic breathing with a duration of 5 seconds in 10 trials, consistently with minimal tactile and verbal clinician cues.     Progressing/ Not Met 4/18/2023   Not formally addressed        In order to promote generalization of intelligibility, patient will produce sentences during  "diaphragmatic breathing while incorporating intelligibility strategies (overarticulation, increased loudness) at 80% accuracy with moderate verbal and visual cues.      Progressing/ Not Met 4/18/2023   Patient completed (with visual, verbal, tactile and motor cues) coordination of diaphragmatic breath for phonation with significantly improved loudness and therefore overall intelligibility. He required maximum cueing for coordination of respiration and phonation with ~65% accuracy in today's session for single words, meaningful/personally relevant phrases such as his name, and  in conversation with ST and caregiver, and when singing [place] songs. Significant progress noted toward use of diaphragmatic breath to increase loudness and intelligibility. Will continue to address and patient and his family encouraged to practice daily at home.      Patient will complete x3-5 rounds of PhoRTE exercises within a session with min cues to increase breath support and loudness.      Progressing/ Not Met 4/18/2023   Patient was stimulable for improved voice using increased projection and modified PhoRTE voice protocol.  Did benefit from complete cueing to improve loudness without changing pitch, for use of diaphragmatic breath for increased loudness and verbal/visual cues for prolonged duration. Patient with improved stimulability using "ROAR" like a tiger to facilitate protocol.       Phonation resistance training exercises (PhoRTE [Fabienne & Kristine, 2013]): to be practiced 2x/day, 7 days/week  - Sustain the vowel /a/ with a loud, energized voice for as long as possible. (x12)  - Glide from modal pitch to high pitch on /a/ using a loud, energized voice. (x3)   - Glide from modal pitch to low pitch on the vowel /a/ using a loud, energized voice. (x3)    - Shout selected functional phrases using a loud and higher-pitched voice, like calling over a fence. (4 phrases, 2x each) Not formally addressed    - Freida selected functional " phrases using a strong, authoritative voice at a low pitch level. (4 phrases, 2x each) Not formally addressed         Patient will rate his speech while reading sentences as at least a 2 on a 3 point scale (1=same as before beginning therapy, 2=better but not best, 3=best possible outcome) on 9 /10 trials given auditory feedback.      Progressing/ Not Met 4/18/2023   Not formally addressed      In order to increase awareness of dysarthria, patient will self-monitor phrases and sentences produced by patient and clinician for varied intonation with minimal cues.     Progressing/ Not Met 4/18/2023   Not formally addressed      In order to increase awareness of dysarthria, patient will self-monitor phrases and sentences for intelligibility using minimal verbal cues.      Progressing/ Not Met 4/18/2023   Not formally addressed      Patient will implement increased intonation, overarticulation, and reduced rate strategies to increase overall intelligibility for functional communication with 80% accuracy and minimal verbal cues for use of strategies.      Progressing/ Not Met 4/18/2023   Not formally addressed      Patient will produce /g/ at the word level in all positions of words with 90% accuracy and minimal cueing over 3 sessions.     Progressing/ Not Met 4/18/2023   Not formally addressed     Patient will produce /k/ at the word level in all positions of words with 90% accuracy and minimal cueing over 3 sessions.    /k/ initial: 100% accuracy, independently met x1  /k/ medial: 88% accuracy, minimal cueing   /k/ final: 70% accuracy, minimal cueing     Progressing/ Not Met 4/18/2023    /k/ initial: Met x2 Not formally addressed   Patient will produce /t/ at the word level in all positions of words with 90% accuracy and minimal cueing over 3 sessions.      /t/ initial: 94% accuracy, independently met x1  /t/ medial: 86% accuracy, minimal cueing   /t/ final: 62% accuracy, mod verbal cueing for final consonant deletion      Progressing/ Not Met 4/18/2023    /t/ initial: Met x1  Not formally addressed        Patient will produce /d/ at the word level in all positions of words with 90% accuracy and minimal cueing over 3 sessions.     /d/ initial: 100% accuracy, mod verbal cueing met x2  /d/ medial: 75% accuracy, mod verbal cueing    /d/ final: 57% accuracy, final consonant devoicing but stimulable 100% of time given cues     Progressing/ Not Met 4/18/2023   Not formally addressed.    Patient will produce /f/ at the word level in all positions of words with 90% accuracy and minimal cueing over 3 sessions.     Progressing/ Not Met 4/18/2023   Not formally addressed   Patient will produce /v/ at the word level in all positions of words with 90% accuracy and minimal cueing over 3 sessions.     Progressing/ Not Met 4/18/2023   Not formally addressed   Patient will produce voiced /th/ at the word level in all positions of words with 90% accuracy and minimal cueing over 3 sessions.     Progressing/ Not Met 4/18/2023   Not formally addressed   Patient will produce voiceless /th/ at the word level in all positions of words with 90% accuracy and minimal cueing over 3 sessions.    Progressing/ Not Met 4/18/2023   Not formally addressed   Patient will produce /z/ at the word level in all positions of words with 90% accuracy and minimal cueing over 3 sessions.    Progressing/ Not Met 4/18/2023   Not formally addressed   Patient will produce /r/ blends at the word level in all positions of words with 90% accuracy and minimal cueing over 3 sessions.    Progressing/ Not Met 4/18/2023   Not formally addressed   Patient will produce /l/ blends at the word level in all positions of words with 90% accuracy and minimal cueing over 3 sessions.    Progressing/ Not Met 4/18/2023   Not formally addressed       Patient Education/Response:   Patient's mother educated extensively regarding nature of dysarthria and domains involved in speech mechanism  "(respiration, phonation, resonance, articulation, prosody) as well as impact of each on intelligibility in patient's speech. She expressed understanding of information provided as evidenced by asking good questions.     Home program established: yes-diaphragmatic breathing and production of simple, personally relevant words for coordination of respiration and phonation  Exercises were reviewed and JEANNETTE was able to demonstrate them prior to the end of the session.  JEANNETTE demonstrated fair  understanding of the education provided.     See Electronic Medical Record under Patient Instructions for exercises provided throughout therapy.  Assessment:   Progress toward increased loudness using modified PhoRTE protocol using "ROAR" in today's session. Patient benefited from use of song to facilitate loudness and use of strategies. Progress noted toward use of strategies with unfamiliar communication partner outside of therapy room. Will continue to address/monitor progress, progressing per patient tolerance.     JEANNETTE is progressing well towards his goals. Current goals remain appropriate. Goals to be updated as necessary.     Patient prognosis is Good. Patient will continue to benefit from skilled outpatient speech and language therapy to address the deficits listed in the problem list on initial evaluation, provide patient/family education and to maximize patient's level of independence in the home and community environment.     Medical necessity is demonstrated by the following IMPAIRMENTS:  Dysarthria and articulation deficits impact patient's ability to communicate effectively in all medical, social, home environments safely and efficiently.     Barriers to Therapy: NA  Patient's spiritual, cultural and educational needs considered and patient agreeable to plan of care and goals.  Plan:   Continue Plan of Care with focus on diaphragmatic breathing, coordination of breath support for phonation and speech and " articulation goals targeting /t/, /d/, /k/, and /g/.    Savannah Whitley BA  Student Speech Language Pathologist    Diana Burton MA, CCC-SLP  Speech Language Pathologist  4/18/2023

## 2023-04-25 ENCOUNTER — CLINICAL SUPPORT (OUTPATIENT)
Dept: REHABILITATION | Facility: HOSPITAL | Age: 33
End: 2023-04-25
Payer: MEDICARE

## 2023-04-25 DIAGNOSIS — R47.1 DYSARTHRIA: ICD-10-CM

## 2023-04-25 DIAGNOSIS — F80.0 SPEECH SOUND DISORDER: Primary | ICD-10-CM

## 2023-04-25 PROCEDURE — 92507 TX SP LANG VOICE COMM INDIV: CPT | Performed by: SPEECH-LANGUAGE PATHOLOGIST

## 2023-04-25 NOTE — PROGRESS NOTES
[facility]  Speech Therapy Progress Note- Dysphagia and Speech  Date: 4/25/2023     Name: Isaías Nelson   MRN: [id number]   Therapy Diagnosis:   Encounter Diagnoses   Name Primary?    Speech sound disorder Yes    Dysarthria    Physician: Latrell Webb MD  Physician Orders: Ambulatory referral/consult to speech therapy   Medical Diagnosis: Pharyngoesophageal dysphagia [R13.14], Oral motor dysfunction [R13.11]    Visit #/ Visits Authorized: 12/ 25 (+eval)  Date of Evaluation:  1/3/23  Insurance Authorization Period: 1/3/2023 - 1/3/2024  Plan of Care Expiration Date:   3/14/23  Extended Plan of Care:  3/14/23-5/23/23  Progress Note: 5/3/23   Visits Cancelled: 0  Visits No Show: 0    Time In:  4:08 PM  Time Out:  4:58 PM  Total Billable Time: 50 mins     Precautions: Standard, Communication, and Reduced health literacy   Subjective:   Patient reports: he presents on time with his caregiver today. Patient states he is going to [place] next week and is very excited.   He was compliant to home exercise program.     Response to previous treatment: use of dysarthria strategies for personally relevant words     Pain Scale:  0/10 on a Visual Analog Scale currently.   Pain Location: NA  Objective:   TIMED  Procedure Min.   Cognitive Therapeutic Interventions, first 15 minutes CPT 93279  0   Cognitive Therapeutic Interventions, each additional 15 minutes CPT 65847  0         UNTIMED  Procedure Min.   Speech- Language- Voice Therapy  50   Dysphagia Therapy  0   Total Timed Units: 0  Total Untimed Units: 1  Charges Billed/Number of units: 97414/1    Short Term Goals: (5 weeks) Current Progress:   Patient will complete diaphragmatic breathing exercises by producing maximal exhalation via diaphragmic breathing with a duration of 5 seconds in 10 trials, consistently with minimal tactile and verbal clinician cues.     Progressing/ Not Met 4/25/2023   Not formally addressed        In order to promote  "generalization of intelligibility, patient will produce sentences during diaphragmatic breathing while incorporating intelligibility strategies (overarticulation, increased loudness) at 80% accuracy with moderate verbal and visual cues.      Progressing/ Not Met 4/25/2023   Patient completed (with visual, verbal, tactile and motor cues) coordination of diaphragmatic breath for phonation with significantly improved loudness and therefore overall intelligibility. He required maximum cueing for coordination of respiration and phonation with ~65% accuracy in today's session for single words, meaningful/personally relevant phrases such as his name, and  in conversation with ST and caregiver, practicing conversations with others, and when singing [place] songs. Significant progress noted toward use of diaphragmatic breath to increase loudness and intelligibility. Additionally, progress toward loudness and intelligibility with unfamiliar communication partners when practicing speaking to "princesses" in preparation for [place] trip. Will continue to address and patient and his family encouraged to practice daily at home.      Patient will complete x3-5 rounds of PhoRTE exercises within a session with min cues to increase breath support and loudness.      Progressing/ Not Met 4/25/2023   Patient was stimulable for improved voice using increased projection and modified PhoRTE voice protocol.  Did benefit from complete cueing to improve loudness without changing pitch, for use of diaphragmatic breath for increased loudness and verbal/visual cues for prolonged duration. Patient with improved stimulability using "ROAR" like a tiger to facilitate protocol.       Phonation resistance training exercises (PhoRTE [Fabienne & Kristine, 2013]): to be practiced 2x/day, 7 days/week  - Sustain the vowel /a/ with a loud, energized voice for as long as possible. (x12)  - Glide from modal pitch to high pitch on /a/ using a loud, energized voice. " (x3) Not formally addressed  - Glide from modal pitch to low pitch on the vowel /a/ using a loud, energized voice. (x3)   Not formally addressed  - Shout selected functional phrases using a loud and higher-pitched voice, like calling over a fence. (4 phrases, 2x each) Not formally addressed    - Shout selected functional phrases using a strong, authoritative voice at a low pitch level. (4 phrases, 2x each) Not formally addressed         Patient will rate his speech while reading sentences as at least a 2 on a 3 point scale (1=same as before beginning therapy, 2=better but not best, 3=best possible outcome) on 9 /10 trials given auditory feedback.      Progressing/ Not Met 4/25/2023   Not formally addressed      In order to increase awareness of dysarthria, patient will self-monitor phrases and sentences produced by patient and clinician for varied intonation with minimal cues.     Progressing/ Not Met 4/25/2023   Not formally addressed      In order to increase awareness of dysarthria, patient will self-monitor phrases and sentences for intelligibility using minimal verbal cues.      Progressing/ Not Met 4/25/2023   Not formally addressed      Patient will implement increased intonation, overarticulation, and reduced rate strategies to increase overall intelligibility for functional communication with 80% accuracy and minimal verbal cues for use of strategies.      Progressing/ Not Met 4/25/2023   Not formally addressed      Patient will produce /g/ at the word level in all positions of words with 90% accuracy and minimal cueing over 3 sessions.     Progressing/ Not Met 4/25/2023   Not formally addressed     Patient will produce /k/ at the word level in all positions of words with 90% accuracy and minimal cueing over 3 sessions.    /k/ initial: 100% accuracy, independently met x1  /k/ medial: 88% accuracy, minimal cueing   /k/ final: 70% accuracy, minimal cueing     Progressing/ Not Met 4/25/2023    /k/ initial: Met  x2 Not formally addressed   Patient will produce /t/ at the word level in all positions of words with 90% accuracy and minimal cueing over 3 sessions.      /t/ initial: 94% accuracy, independently met x1  /t/ medial: 86% accuracy, minimal cueing   /t/ final: 62% accuracy, mod verbal cueing for final consonant deletion     Progressing/ Not Met 4/25/2023    /t/ initial: Met x1  Not formally addressed        Patient will produce /d/ at the word level in all positions of words with 90% accuracy and minimal cueing over 3 sessions.     /d/ initial: 100% accuracy, mod verbal cueing met x2  /d/ medial: 75% accuracy, mod verbal cueing    /d/ final: 57% accuracy, final consonant devoicing but stimulable 100% of time given cues     Progressing/ Not Met 4/25/2023   Not formally addressed.    Patient will produce /f/ at the word level in all positions of words with 90% accuracy and minimal cueing over 3 sessions.     Progressing/ Not Met 4/25/2023   Not formally addressed   Patient will produce /v/ at the word level in all positions of words with 90% accuracy and minimal cueing over 3 sessions.     Progressing/ Not Met 4/25/2023   Not formally addressed   Patient will produce voiced /th/ at the word level in all positions of words with 90% accuracy and minimal cueing over 3 sessions.     Progressing/ Not Met 4/25/2023   Not formally addressed   Patient will produce voiceless /th/ at the word level in all positions of words with 90% accuracy and minimal cueing over 3 sessions.    Progressing/ Not Met 4/25/2023   Not formally addressed   Patient will produce /z/ at the word level in all positions of words with 90% accuracy and minimal cueing over 3 sessions.    Progressing/ Not Met 4/25/2023   Not formally addressed   Patient will produce /r/ blends at the word level in all positions of words with 90% accuracy and minimal cueing over 3 sessions.    Progressing/ Not Met 4/25/2023   Not formally addressed   Patient will produce /l/  "blends at the word level in all positions of words with 90% accuracy and minimal cueing over 3 sessions.    Progressing/ Not Met 4/25/2023   Not formally addressed       Patient Education/Response:   Patient's mother educated extensively regarding nature of dysarthria and domains involved in speech mechanism (respiration, phonation, resonance, articulation, prosody) as well as impact of each on intelligibility in patient's speech. She expressed understanding of information provided as evidenced by asking good questions.     Home program established: yes-diaphragmatic breathing and production of simple, personally relevant words for coordination of respiration and phonation  Exercises were reviewed and JEANNETTE was able to demonstrate them prior to the end of the session.  JEANNETTE demonstrated fair  understanding of the education provided.     See Electronic Medical Record under Patient Instructions for exercises provided throughout therapy.  Assessment:   Progress toward increased loudness using modified PhoRTE protocol using "ROAR" in today's session. Progress noted toward use of strategies with unfamiliar communication partner outside of therapy room through practicing conversations with "[place] princesses" in preparation for [place] trip next week. Additionally, patient with increased loudness during singing of songs. Will continue to address/monitor progress, progressing per patient tolerance.     JEANNETTE is progressing well towards his goals. Current goals remain appropriate. Goals to be updated as necessary.     Patient prognosis is Good. Patient will continue to benefit from skilled outpatient speech and language therapy to address the deficits listed in the problem list on initial evaluation, provide patient/family education and to maximize patient's level of independence in the home and community environment.     Medical necessity is demonstrated by the following IMPAIRMENTS:  Dysarthria and articulation " deficits impact patient's ability to communicate effectively in all medical, social, home environments safely and efficiently.     Barriers to Therapy: NA  Patient's spiritual, cultural and educational needs considered and patient agreeable to plan of care and goals.  Plan:   Continue Plan of Care with focus on diaphragmatic breathing, coordination of breath support for phonation and speech and articulation goals targeting /t/, /d/, /k/, and /g/.    GLORY Early  Student Speech Language Pathologist    Diana Burton MA, CCC-SLP  Speech Language Pathologist  4/25/2023

## 2023-05-09 ENCOUNTER — CLINICAL SUPPORT (OUTPATIENT)
Dept: REHABILITATION | Facility: HOSPITAL | Age: 33
End: 2023-05-09
Payer: MEDICARE

## 2023-05-09 DIAGNOSIS — F80.0 SPEECH SOUND DISORDER: Primary | ICD-10-CM

## 2023-05-09 DIAGNOSIS — R47.1 DYSARTHRIA: ICD-10-CM

## 2023-05-09 PROCEDURE — 92507 TX SP LANG VOICE COMM INDIV: CPT | Performed by: SPEECH-LANGUAGE PATHOLOGIST

## 2023-05-09 NOTE — PROGRESS NOTES
[facility]  Speech Therapy Progress Note/Discharge Summary- Dysphagia and Speech  Date: 5/9/2023     Name: Isaías Nelson   MRN: [id number]   Therapy Diagnosis:   Encounter Diagnoses   Name Primary?    Speech sound disorder Yes    Dysarthria    Physician: Latrell Webb MD  Physician Orders: Ambulatory referral/consult to speech therapy   Medical Diagnosis: Pharyngoesophageal dysphagia [R13.14], Oral motor dysfunction [R13.11]    Visit #/ Visits Authorized: 13/ 25 (+eval)  Date of Evaluation:  1/3/23  Insurance Authorization Period: 1/3/2023 - 1/3/2024  Plan of Care Expiration Date:   3/14/23  Extended Plan of Care:  3/14/23-5/23/23  Progress Note: 5/3/23   Visits Cancelled: 0  Visits No Show: 0    Time In:  4:20 PM  Time Out:  5:00 PM  Total Billable Time: 40 mins     Precautions: Standard, Communication, and Reduced health literacy   Subjective:   Patient reports: he presents on time with his caregiver today. Patient states he loved his trip to [place] last week.   He was compliant to home exercise program.     Response to previous treatment: use of dysarthria strategies for personally relevant words     Pain Scale:  0/10 on a Visual Analog Scale currently.   Pain Location: NA  Objective:   TIMED  Procedure Min.   Cognitive Therapeutic Interventions, first 15 minutes CPT 86566  0   Cognitive Therapeutic Interventions, each additional 15 minutes CPT 53443  0         UNTIMED  Procedure Min.   Speech- Language- Voice Therapy  40   Dysphagia Therapy  0   Total Timed Units: 0  Total Untimed Units: 1  Charges Billed/Number of units: 82293/1    Short Term Goals: (5 weeks) Current Progress:   Patient will complete diaphragmatic breathing exercises by producing maximal exhalation via diaphragmic breathing with a duration of 5 seconds in 10 trials, consistently with minimal tactile and verbal clinician cues.     Goal Met 5/9/2023   Patient executed diaphragmatic breathing with minimal-moderate  "verbal and visual cueing for reduced thoracic movement. Patient and his caregiver encouraged to complete prior to voice/speech and loudness HEP.        In order to promote generalization of intelligibility, patient will produce sentences during diaphragmatic breathing while incorporating intelligibility strategies (overarticulation, increased loudness) at 80% accuracy with moderate verbal and visual cues.      Goal Met 5/9/2023   Patient completed (with visual, verbal, tactile and motor cues) coordination of diaphragmatic breath for phonation with significantly improved loudness and therefore overall intelligibility. He required maximum cueing for coordination of respiration and phonation with ~70% accuracy in today's session for single words, meaningful/personally relevant phrases such as his name, and in conversation with ST and caregiver, practicing conversations with others, and when singing [place] songs. Significant progress noted toward use of diaphragmatic breath to increase loudness and intelligibility. Patient and his family encouraged to practice daily at home.      Patient will complete x3-5 rounds of PhoRTE exercises within a session with min cues to increase breath support and loudness.      Goal Met 5/9/2023   Patient was stimulable for improved voice using increased projection and modified PhoRTE voice protocol.  Did benefit from complete cueing to improve loudness without changing pitch, for use of diaphragmatic breath for increased loudness and verbal/visual cues for prolonged duration. Patient with improved stimulability using "ROAR" like a tiger to facilitate protocol.       Phonation resistance training exercises (PhoRTE [Fabienne & Kristine, 2013]): to be practiced 2x/day, 7 days/week  - Sustain the vowel /a/ with a loud, energized voice for as long as possible. (x12)  - Glide from modal pitch to high pitch on /a/ using a loud, energized voice. (x3) Not formally addressed  - Glide from modal pitch " to low pitch on the vowel /a/ using a loud, energized voice. (x3)   Not formally addressed  - Shout selected functional phrases using a loud and higher-pitched voice, like calling over a fence. (4 phrases, 2x each) Not formally addressed    - Shout selected functional phrases using a strong, authoritative voice at a low pitch level. (4 phrases, 2x each) Not formally addressed         In order to increase awareness of dysarthria, patient will self-monitor phrases and sentences produced by patient and clinician for varied intonation with minimal cues.     Progressing/ Not Met 5/9/2023   Not formally addressed      In order to increase awareness of dysarthria, patient will self-monitor phrases and sentences for intelligibility using minimal verbal cues.      Progressing/ Not Met 5/9/2023   Not formally addressed      Patient will produce /g/ at the word level in all positions of words with 90% accuracy and minimal cueing over 3 sessions.     Progressing/ Not Met 5/9/2023   Not formally addressed     Patient will produce /k/ at the word level in all positions of words with 90% accuracy and minimal cueing over 3 sessions.    /k/ initial: 100% accuracy, independently met x1  /k/ medial: 88% accuracy, minimal cueing   /k/ final: 70% accuracy, minimal cueing     Progressing/ Not Met 5/9/2023    /k/ initial: Met x2 Not formally addressed   Patient will produce /t/ at the word level in all positions of words with 90% accuracy and minimal cueing over 3 sessions.      /t/ initial: 94% accuracy, independently met x1  /t/ medial: 86% accuracy, minimal cueing   /t/ final: 62% accuracy, mod verbal cueing for final consonant deletion     Progressing/ Not Met 5/9/2023    /t/ initial: Met x1  Not formally addressed        Patient will produce /d/ at the word level in all positions of words with 90% accuracy and minimal cueing over 3 sessions.     /d/ initial: 100% accuracy, mod verbal cueing met x2  /d/ medial: 75% accuracy, mod  verbal cueing    /d/ final: 57% accuracy, final consonant devoicing but stimulable 100% of time given cues     Progressing/ Not Met 5/9/2023   Not formally addressed.    Patient will produce /f/ at the word level in all positions of words with 90% accuracy and minimal cueing over 3 sessions.     Progressing/ Not Met 5/9/2023   Not formally addressed   Patient will produce /v/ at the word level in all positions of words with 90% accuracy and minimal cueing over 3 sessions.     Progressing/ Not Met 5/9/2023   Not formally addressed   Patient will produce voiced /th/ at the word level in all positions of words with 90% accuracy and minimal cueing over 3 sessions.     Progressing/ Not Met 5/9/2023   Not formally addressed   Patient will produce voiceless /th/ at the word level in all positions of words with 90% accuracy and minimal cueing over 3 sessions.    Progressing/ Not Met 5/9/2023   Not formally addressed   Patient will produce /z/ at the word level in all positions of words with 90% accuracy and minimal cueing over 3 sessions.    Progressing/ Not Met 5/9/2023   Not formally addressed   Patient will produce /r/ blends at the word level in all positions of words with 90% accuracy and minimal cueing over 3 sessions.    Progressing/ Not Met 5/9/2023   Not formally addressed   Patient will produce /l/ blends at the word level in all positions of words with 90% accuracy and minimal cueing over 3 sessions.    Progressing/ Not Met 5/9/2023   Not formally addressed       Patient Education/Response:   Patient's mother educated extensively regarding nature of dysarthria and domains involved in speech mechanism (respiration, phonation, resonance, articulation, prosody) as well as impact of each on intelligibility in patient's speech. She expressed understanding of information provided as evidenced by asking good questions.     Home program established: yes-diaphragmatic breathing and production of simple, personally  "relevant words for coordination of respiration and phonation  Exercises were reviewed and JEANNETTE was able to demonstrate them prior to the end of the session.  JEANNETTE demonstrated fair  understanding of the education provided.     See Electronic Medical Record under Patient Instructions for exercises provided throughout therapy.  Assessment:   Assessment of Current Status: Patient has made limited progress over course of therapy in functional carryover of goals addressed. He is able to increasingly use diaphragmatic breath to coordinate with phonation; however, this inconsistently is able to positively impact loudness. He requires moderate-maximum verbal, visual and motor cueing (increasing distance between therapist and patient) to improve loudness; however, he is eventually able to increase loudness for overall improved intelligibility. He and his caregiver were encouraged to continue diaphragmatic breathing, PhoRTE program ("roaring" like a tiger) and increased loudness and overarticulation for personally relevant phrases as part of HEP at home. Patient has reached maximum rehab potential for present time. Patient and family/caregiver in agreement with plan for discharge.      Goals: See progress toward goals above.     Long Term Goals:  Patient will maintain adequate hydration/nutrition with optimum safety and efficiency of swallowing function on PO intake without overt signs and symptoms of aspiration for least restrictive diet level.   Patient will develop functional and intelligible speech and utilize compensatory strategies through the use of adequate labial and lingual function, increased articulatory precision and speech prosody     Discharge reason: Patient has reached the maximum rehab potential for the present time  Plan:   This patient is discharged from Speech Therapy    [place] CHARLEY Burton, CCC-SLP  Speech Language Pathologist  5/9/2023                         "